# Patient Record
Sex: MALE | Race: WHITE
[De-identification: names, ages, dates, MRNs, and addresses within clinical notes are randomized per-mention and may not be internally consistent; named-entity substitution may affect disease eponyms.]

---

## 2018-03-19 ENCOUNTER — HOSPITAL ENCOUNTER (EMERGENCY)
Dept: HOSPITAL 18 - NAV ERS | Age: 63
Discharge: ANOTHER HOSPITAL | End: 2018-03-19
Payer: COMMERCIAL

## 2018-03-19 DIAGNOSIS — F17.210: ICD-10-CM

## 2018-03-19 DIAGNOSIS — I10: ICD-10-CM

## 2018-03-19 DIAGNOSIS — Z79.51: ICD-10-CM

## 2018-03-19 DIAGNOSIS — Z79.899: ICD-10-CM

## 2018-03-19 DIAGNOSIS — F10.27: ICD-10-CM

## 2018-03-19 DIAGNOSIS — E78.5: ICD-10-CM

## 2018-03-19 DIAGNOSIS — J44.9: ICD-10-CM

## 2018-03-19 DIAGNOSIS — I48.91: Primary | ICD-10-CM

## 2018-03-19 DIAGNOSIS — E11.9: ICD-10-CM

## 2018-03-19 LAB
ALBUMIN SERPL BCG-MCNC: 3.7 G/DL (ref 3.4–4.8)
ALP SERPL-CCNC: 85 U/L (ref 40–150)
ALT SERPL W P-5'-P-CCNC: 20 U/L (ref 8–55)
ANION GAP SERPL CALC-SCNC: 20 MMOL/L (ref 10–20)
APTT PPP: 28.1 SEC (ref 22.9–36.1)
AST SERPL-CCNC: 30 U/L (ref 5–34)
BASOPHILS # BLD AUTO: 0.2 THOU/UL (ref 0–0.2)
BASOPHILS NFR BLD AUTO: 1.9 % (ref 0–1)
BILIRUB SERPL-MCNC: 0.3 MG/DL (ref 0.2–1.2)
BUN SERPL-MCNC: 16 MG/DL (ref 8.4–25.7)
CALCIUM SERPL-MCNC: 9.5 MG/DL (ref 7.8–10.44)
CHLORIDE SERPL-SCNC: 101 MMOL/L (ref 98–107)
CK MB SERPL-MCNC: 2.7 NG/ML (ref 0–6.6)
CK SERPL-CCNC: 105 U/L (ref 30–200)
CO2 SERPL-SCNC: 19 MMOL/L (ref 23–31)
CREAT CL PREDICTED SERPL C-G-VRATE: 0 ML/MIN (ref 70–130)
EOSINOPHIL # BLD AUTO: 0.1 THOU/UL (ref 0–0.7)
EOSINOPHIL NFR BLD AUTO: 0.6 % (ref 0–10)
GLOBULIN SER CALC-MCNC: 2.9 G/DL (ref 2.4–3.5)
GLUCOSE SERPL-MCNC: 171 MG/DL (ref 80–115)
HGB BLD-MCNC: 11.3 G/DL (ref 14–18)
INR PPP: 1.2
LIPASE SERPL-CCNC: 19 U/L (ref 8–78)
LYMPHOCYTES # BLD AUTO: 1.7 THOU/UL (ref 1.2–3.4)
LYMPHOCYTES NFR BLD AUTO: 16.9 % (ref 21–51)
MCH RBC QN AUTO: 26.4 PG (ref 27–31)
MCV RBC AUTO: 81.6 FL (ref 80–94)
MONOCYTES # BLD AUTO: 1.8 THOU/UL (ref 0.11–0.59)
MONOCYTES NFR BLD AUTO: 17.3 % (ref 0–10)
NEUTROPHILS # BLD AUTO: 6.5 THOU/UL (ref 1.4–6.5)
NEUTROPHILS NFR BLD AUTO: 63.3 % (ref 42–75)
PLATELET # BLD AUTO: 158 THOU/UL (ref 130–400)
POTASSIUM SERPL-SCNC: 3.9 MMOL/L (ref 3.5–5.1)
PROTHROMBIN TIME: 15.3 SEC (ref 12–14.7)
RBC # BLD AUTO: 4.3 MILL/UL (ref 4.7–6.1)
SODIUM SERPL-SCNC: 136 MMOL/L (ref 136–145)
TROPONIN I SERPL DL<=0.01 NG/ML-MCNC: (no result) NG/ML (ref ?–0.03)
WBC # BLD AUTO: 10.2 THOU/UL (ref 4.8–10.8)

## 2018-03-19 PROCEDURE — 96372 THER/PROPH/DIAG INJ SC/IM: CPT

## 2018-03-19 PROCEDURE — 93005 ELECTROCARDIOGRAM TRACING: CPT

## 2018-03-19 PROCEDURE — 94760 N-INVAS EAR/PLS OXIMETRY 1: CPT

## 2018-03-19 PROCEDURE — 80053 COMPREHEN METABOLIC PANEL: CPT

## 2018-03-19 PROCEDURE — 84484 ASSAY OF TROPONIN QUANT: CPT

## 2018-03-19 PROCEDURE — 82553 CREATINE MB FRACTION: CPT

## 2018-03-19 PROCEDURE — 85610 PROTHROMBIN TIME: CPT

## 2018-03-19 PROCEDURE — 96376 TX/PRO/DX INJ SAME DRUG ADON: CPT

## 2018-03-19 PROCEDURE — 85025 COMPLETE CBC W/AUTO DIFF WBC: CPT

## 2018-03-19 PROCEDURE — 83690 ASSAY OF LIPASE: CPT

## 2018-03-19 PROCEDURE — 82550 ASSAY OF CK (CPK): CPT

## 2018-03-19 PROCEDURE — 83880 ASSAY OF NATRIURETIC PEPTIDE: CPT

## 2018-03-19 PROCEDURE — 80307 DRUG TEST PRSMV CHEM ANLYZR: CPT

## 2018-03-19 PROCEDURE — 71045 X-RAY EXAM CHEST 1 VIEW: CPT

## 2018-03-19 PROCEDURE — 96365 THER/PROPH/DIAG IV INF INIT: CPT

## 2018-03-19 PROCEDURE — 85730 THROMBOPLASTIN TIME PARTIAL: CPT

## 2018-03-19 PROCEDURE — 36416 COLLJ CAPILLARY BLOOD SPEC: CPT

## 2018-03-20 ENCOUNTER — HOSPITAL ENCOUNTER (INPATIENT)
Dept: HOSPITAL 92 - ERS | Age: 63
LOS: 4 days | Discharge: HOME | DRG: 274 | End: 2018-03-24
Attending: INTERNAL MEDICINE | Admitting: INTERNAL MEDICINE
Payer: COMMERCIAL

## 2018-03-20 VITALS — BODY MASS INDEX: 16 KG/M2

## 2018-03-20 DIAGNOSIS — E86.0: ICD-10-CM

## 2018-03-20 DIAGNOSIS — I48.2: Primary | ICD-10-CM

## 2018-03-20 DIAGNOSIS — E03.9: ICD-10-CM

## 2018-03-20 DIAGNOSIS — F10.239: ICD-10-CM

## 2018-03-20 DIAGNOSIS — K52.9: ICD-10-CM

## 2018-03-20 DIAGNOSIS — E11.9: ICD-10-CM

## 2018-03-20 DIAGNOSIS — F10.27: ICD-10-CM

## 2018-03-20 DIAGNOSIS — D63.8: ICD-10-CM

## 2018-03-20 DIAGNOSIS — I10: ICD-10-CM

## 2018-03-20 DIAGNOSIS — Z91.14: ICD-10-CM

## 2018-03-20 DIAGNOSIS — I25.10: ICD-10-CM

## 2018-03-20 DIAGNOSIS — F17.210: ICD-10-CM

## 2018-03-20 DIAGNOSIS — Z95.1: ICD-10-CM

## 2018-03-20 DIAGNOSIS — F10.221: ICD-10-CM

## 2018-03-20 DIAGNOSIS — E78.5: ICD-10-CM

## 2018-03-20 DIAGNOSIS — R55: ICD-10-CM

## 2018-03-20 DIAGNOSIS — E44.0: ICD-10-CM

## 2018-03-20 LAB
ALBUMIN SERPL BCG-MCNC: 3.3 G/DL (ref 3.4–4.8)
ALP SERPL-CCNC: 79 U/L (ref 40–150)
ALT SERPL W P-5'-P-CCNC: 14 U/L (ref 8–55)
AST SERPL-CCNC: 14 U/L (ref 5–34)
BILIRUB DIRECT SERPL-MCNC: 0.1 MG/DL (ref 0.1–0.3)
BILIRUB SERPL-MCNC: 0.3 MG/DL (ref 0.2–1.2)
CRYSTAL-AUWI FLAG: 0 (ref 0–15)
DRUG SCREEN CUTOFF: (no result)
HEV IGM SER QL: 1.8 (ref 0–7.99)
HYALINE CASTS #/AREA URNS LPF: (no result) LPF
MEDTOX CONTROL LINE VALID?: (no result)
MEDTOX READER #: (no result)
PATHC CAST-AUWI FLAG: 0 (ref 0–2.49)
RBC UR QL AUTO: (no result) HPF (ref 0–3)
SP GR UR STRIP: (no result) (ref 1–1.04)
SPERM-AUWI FLAG: 0 (ref 0–9.9)
TROPONIN I SERPL DL<=0.01 NG/ML-MCNC: (no result) NG/ML (ref ?–0.03)
TROPONIN I SERPL DL<=0.01 NG/ML-MCNC: (no result) NG/ML (ref ?–0.03)
WBC UR QL AUTO: (no result) HPF (ref 0–3)
YEAST-AUWI FLAG: 0 (ref 0–25)

## 2018-03-20 PROCEDURE — A4216 STERILE WATER/SALINE, 10 ML: HCPCS

## 2018-03-20 PROCEDURE — 93312 ECHO TRANSESOPHAGEAL: CPT

## 2018-03-20 PROCEDURE — 85025 COMPLETE CBC W/AUTO DIFF WBC: CPT

## 2018-03-20 PROCEDURE — 96375 TX/PRO/DX INJ NEW DRUG ADDON: CPT

## 2018-03-20 PROCEDURE — 93623 PRGRMD STIMJ&PACG IV RX NFS: CPT

## 2018-03-20 PROCEDURE — 85018 HEMOGLOBIN: CPT

## 2018-03-20 PROCEDURE — 85610 PROTHROMBIN TIME: CPT

## 2018-03-20 PROCEDURE — 76942 ECHO GUIDE FOR BIOPSY: CPT

## 2018-03-20 PROCEDURE — 96366 THER/PROPH/DIAG IV INF ADDON: CPT

## 2018-03-20 PROCEDURE — 93653 COMPRE EP EVAL TX SVT: CPT

## 2018-03-20 PROCEDURE — 74177 CT ABD & PELVIS W/CONTRAST: CPT

## 2018-03-20 PROCEDURE — C1769 GUIDE WIRE: HCPCS

## 2018-03-20 PROCEDURE — 36415 COLL VENOUS BLD VENIPUNCTURE: CPT

## 2018-03-20 PROCEDURE — 96367 TX/PROPH/DG ADDL SEQ IV INF: CPT

## 2018-03-20 PROCEDURE — C1730 CATH, EP, 19 OR FEW ELECT: HCPCS

## 2018-03-20 PROCEDURE — 93306 TTE W/DOPPLER COMPLETE: CPT

## 2018-03-20 PROCEDURE — 96365 THER/PROPH/DIAG IV INF INIT: CPT

## 2018-03-20 PROCEDURE — 84443 ASSAY THYROID STIM HORMONE: CPT

## 2018-03-20 PROCEDURE — 84484 ASSAY OF TROPONIN QUANT: CPT

## 2018-03-20 PROCEDURE — 80076 HEPATIC FUNCTION PANEL: CPT

## 2018-03-20 PROCEDURE — 80306 DRUG TEST PRSMV INSTRMNT: CPT

## 2018-03-20 PROCEDURE — 83036 HEMOGLOBIN GLYCOSYLATED A1C: CPT

## 2018-03-20 PROCEDURE — 85014 HEMATOCRIT: CPT

## 2018-03-20 PROCEDURE — 80048 BASIC METABOLIC PNL TOTAL CA: CPT

## 2018-03-20 PROCEDURE — 93613 INTRACARDIAC EPHYS 3D MAPG: CPT

## 2018-03-20 PROCEDURE — 81001 URINALYSIS AUTO W/SCOPE: CPT

## 2018-03-20 PROCEDURE — 93005 ELECTROCARDIOGRAM TRACING: CPT

## 2018-03-20 PROCEDURE — 70450 CT HEAD/BRAIN W/O DYE: CPT

## 2018-03-20 RX ADMIN — AMIODARONE HYDROCHLORIDE SCH MLS: 50 INJECTION, SOLUTION INTRAVENOUS at 18:06

## 2018-03-20 RX ADMIN — Medication SCH ML: at 21:00

## 2018-03-20 RX ADMIN — BUPROPION HYDROCHLORIDE SCH MG: 100 TABLET, EXTENDED RELEASE ORAL at 23:22

## 2018-03-20 NOTE — CON-2
Dictated as scribe for Dr. Montana Ortega



DATE OF CONSULTATION:  03/20/2018

 

REFERRING PHYSICIAN:  Jeff Nuñez M.D.

 

REASON FOR CONSULTATION:  Atrial arrhythmias.

 

HISTORY OF PRESENT ILLNESS:  Mr. Medellin is a 63-year-old gentleman who 
presented to the emergency room today 03/20/2018 with complaints of palpitations
, weakness, passing out and also some abdominal pain.  His daughter is with him 
as patient is a very poor historian and struggles significantly with alcohol 
abuse and some dementia.  He reports that he has a history of atrial 
fibrillation and that he has been on chronic anticoagulation with Coumadin for 
years now.  He has not been consistent with his therapy on this and when he 
runs out, will just go on aspirin alone.  He denies any prior history of 
strokes or any recent stroke or stroke-like symptoms including unilateral 
weakness, speech changes, vision changes, paresthesias or facial droop.  He 
does report that he occasionally will have his heart race, that he has 
dizziness and lightheadedness and that this usually occurs with exertion which 
he attributes to working too hard outside.  He also reports that he has had 
some mild abdominal pain, but denies nausea, vomiting, diarrhea, constipation 
or blood in the stool.  He has shortness of breath with exertion and endorses 
smoking 1-2 packs of cigarettes a day for past 50 years.  He continues to binge 
drink on a frequent basis, but reports his last drink was approximately a week 
ago.  In the past has seen a Dr. Kamla Gurrola for primary care and Dr. Dann Shi for Cardiology.

 

PAST MEDICAL HISTORY:

1.  Atrial fibrillation, status post maze procedure at time of coronary artery 
bypass grafting.

2.  Coronary artery disease, status post 2-vessel bypass in 2013

3.  Hyperlipidemia.

4.  Hypertension.

5.  Chronic obstructive pulmonary disease.

6.  Hypothyroidism.

7.  Type 2 diabetes.

8.  Dementia, secondary to alcohol abuse.

 

PAST SURGICAL HISTORY:  

1.  Coronary artery bypass, 2-vessel in 2013 with maze procedure in 2013 (
unsuccessful).  

2.  Electrical cardioversion x4 for atrial fibrillation.

 

FAMILY HISTORY:  Positive for coronary artery disease (brother).  Negative for 
aortic aneurysm.

 

SOCIAL HISTORY:  Current tobacco habituation greater than a 50-pack-year 
history.  Binge drinks at least on a weekly basis.  Reports no illicit drug use.

 

REVIEW OF SYSTEMS:

CONSTITUTIONAL:  Negative for fevers, chills or fatigue.

HEENT:  Negative for blurred vision, ringing in the ears or nose bleeds.

CARDIOVASCULAR:  Negative for chest pain.  Positive for heart racing and 
palpitations.

RESPIRATORY:  Positive for dyspnea on exertion.  Negative for blood in the 
sputum, productive cough or progressive shortness of breath.

GI:  Negative for nausea, vomiting, diarrhea or blood in the stool.  Positive 
for mild abdominal pain.

GENITOURINARY:  Negative for frequency, hesitancy or blood in the urine.

NEUROLOGIC:  Positive for multiple episodes of passing out in the recent past, 
most follow standing up after using the toilet.

HEMATOLOGIC:  Negative for excessive uncontrolled bleeding or bleeding 
dyscrasias.

 

MEDICATIONS AT HOME:  PENICILLIN, PROAMATINE, CODEINE, CLONAZEPAM.  

 

CURRENT MEDICATIONS:  (According to office visit by Dr. Dann Shi 01/04/
2017), buspirone HCL 30 mg 1 tab b.i.d., levothyroxine sodium 75 mcg 1 tab p.o. 
daily, lovastatin 10 mg p.o. daily, sublingual nitro as needed, propranolol 40 
mg p.o. b.i.d., albuterol as needed, aspirin 325 mg daily, Coumadin 5 mg as 
directed, Norvasc 5 mg 1 tablet daily, ropinirole 0.5 mg p.o. at bedtime, 
Spiriva as directed, Symbicort 160/4.5 oral inhaler, trazodone 100 mg 2 tabs 
p.o. at bedtime and Ventolin HFA as directed.

 

PHYSICAL EXAMINATION:

VITAL SIGNS:  Temperature 97.2, heart rate is 101, blood pressure 118/61, 
respirations are 20, oxygen is 98% on room air.  The patient is 6 feet, 118 
pounds, BMI of 16.1.

GENERAL:  The patient is a malnourished  male in no acute distress.  
He is resting in bed, but is very fidgety. He appears older than his stated age
, frail and weak.

HEENT:  He is normocephalic, atraumatic.  His pupils are equal, round, reactive 
to light and accommodating.  Sclerae are anicteric.  His speech is clear and he 
is somewhat restless in his behavior.

NECK:  Supple without jugular venous distention.  His thyroid is nonpalpable 
and carotids are without bruit.

CARDIOVASCULAR:  His heart rate is irregularly irregular.  His extremities are 
warm and dry to touch without clubbing, cyanosis or edema.  PMI is nondisplaced.

PULMONARY:  Respirations are even and unlabored, but wheezing is noted 
throughout all lung fields bilaterally.  

ABDOMEN:  Soft and nontender, no palpable masses.  Hepatojugular reflex is 
negative.

NEUROLOGIC:  Cranial nerves II-XII is grossly intact and exam is nonfocal.  The 
patient answers orientation questions appropriately, but attention span is 
quite short.

 

DATABASE:  Chemistry from today; hemoglobin A1c is 5.0, total bilirubin is 0.3, 
AST is 14, ALT 14, alkaline phosphatase is 79.  Two troponin I's have been 
collected and are less than 0.01.  Albumin is 3.3.  TSH is 6.15.  No other lab 
results are available.  

 

Telemetry and EKGs were personally reviewed and indicate typical CTI dependent 
flutter with variable AV conduction, mostly 2:1.  No atrial fibrillation is 
seen.  Previous EKGs document RVR as high as 137 beats per minute.  The patient 
is currently maintaining in the  beat per minute range.

 

IMPRESSION:

1.  Typical atrial flutter with rapid ventricular response.

2.  History of atrial fibrillation, status post maze procedure without evidence 
of recurrence this admission; however, according to record review with multiple 
failed cardioversions for atrial fibrillation in the past.

3.  Coronary artery disease with prior 2-vessel bypass in 2013.

4.  Alcohol abuse.

5.  Loss of consciousness with possible orthostatic blood pressure with 
elevated heart rates given his arrhythmia.  Possibly cardiogenic in origin, but 
likely a vasovagal response.

 

PLAN:  

1.  Our recommendation is to proceed with CTI ablation for typical atrial 
flutter tomorrow after a MIGUEL done by Cardiology to evaluate for any possible 
intracoronary thrombus given the patient's intermittent and less than compliant 
therapy with the warfarin.

2.  We will follow up as an outpatient for additional evaluation for a Watchman 
given the patient's recent falls and risk for bleeding.  We will monitor for 
recurrence of arrhythmias and discuss Watchman further with him in clinic.  

3.  Regarding the patient's alcohol abuse and his possible withdrawal symptoms 
beginning we will schedule the patient for his ablation tomorrow, but if he 
begins to have significant withdrawal symptoms the procedure may need to be 
postponed for patient safety.

 

Thank you for allowing us to participate in the care of this patient.

 

I performed the interview and physicial exam with decision making personally.  
PARAM

## 2018-03-20 NOTE — CT
PRELIMINARY REPORT/[facility]/EMERGENCY AFTER

HOURS PROCEDURE:

 

EXAM:

CT Abdomen and Pelvis With Intravenous Contrast

 

EXAM DATE/TIME:

Exam ordered 3/20/2018 2:35 AM

 

CLINICAL HISTORY:

63 years old, male; Pain; Abdominal pain; Generalized; Patient HX: 63m transfer from [facility] for generali
zed weakness. Found to be in a fib with rvr at a rate of 140's given lovenox and started on dilt drip
. Patient now complaining of diffuse abdominal pain. Has not been taking coumadin in 1 week.

Denies chest pain and shortness of breath

 

TECHNIQUE:

Axial computed tomography images of the abdomen and pelvis with intravenous contrast. All CT scans at
 this facility use one or more dose reduction techniques, viz.: automated exposure control;

ma/kV adjustment per patient size (including targeted exams where dose is matched to indication; i.e.
 head); or iterative reconstruction technique.

Coronal reformatted images were created and reviewed.

 

CONTRAST:

100 mL of  administered intravenously.

 

COMPARISON:

No relevant prior studies available.

 

FINDINGS:

Lower thorax: No acute findings.

 

ABDOMEN:

Liver: No pneumatosis or portal/mesenteric venous gas.

Gallbladder and bile ducts: Unremarkable. No calcified stones. No ductal dilation.

Pancreas: Unremarkable. No mass. No ductal dilation.

Spleen: Unremarkable. No splenomegaly.

Adrenals: Unremarkable. No mass.

Kidneys and ureters: Unremarkable. No solid mass. No hydronephrosis.

Stomach and bowel: Inflammatory thickening of the wall of short segments of distal ileum, including t
he terminal ileum, as well as the colon, consistent with enteritis and colitis. No intestinal obstruc
tion.

Appendix: Appendix not visualized. No evidence of appendicitis.

 

PELVIS:

Bladder: Unremarkable. No mass.

Reproductive: Unremarkable as visualized.

 

ABDOMEN and PELVIS:

Intraperitoneal space: Trace volume ascites. No pneumoperitoneum or abscess.

Bones/joints: No acute fracture. No dislocation.

Soft tissues: Bilateral fat containing inguinal hernias.

Vasculature: Multifocal AAA, measuring up to 2.5 cm. No rupture. SMV and SMA are patent as far as can
 be traced.

Lymph nodes: Unremarkable. No enlarged lymph nodes.

 

IMPRESSION:

1. Enteritis, including the terminal ileum, and colitis. Findings are most likely a result of infecti
on or inflammatory bowel disease. Ischemia less likely but not excluded. No specific ancillary findin
gs of bowel ischemia.

2. Trace volume ascites.

 

Thank you for allowing us to participate in the care of your patient.

 

Dictated and Authenticated by: Denzel Anderson MD

03/20/2018 3:23 AM Central Time ([place] & [place])

 

 

 

FINAL REPORT 

CT ABDOMEN AND PELVIS WITH IV CONTRAST:

 

FINDINGS/IMPRESSION: 

I agree with the preliminary report given by Dr. Denzel Anderson of [facility]. Additionally, there is colo
jesus diverticulosis without diverticulitis. 

 

POS: OFF

## 2018-03-20 NOTE — CON
DATE OF CONSULTATION:  03/20/2018

 

HISTORY OF PRESENT ILLNESS:  The patient is a 63-year-old gentleman with a 
history of chronic atrial fibrillation, who presented after losing 
consciousness.  The patient has a history of atrial fibrillation.  He is on 
chronic anticoagulation therapy.  The patient also has a history of coronary 
artery disease and is status post coronary bypass surgery x3.  The patient has 
a history also of dementia and alcohol abuse.  The patient was in his usual 
state of health when apparently he suddenly lost consciousness.  The patient 
states that this occurred on several occasions.  The patient denied having any 
palpitations.

 

PAST MEDICAL HISTORY:  

1.  Coronary artery disease.

2.  COPD.

3.  Coronary bypass surgery.

4.  Hypertension.

 

PAST SURGICAL HISTORY:  Appendectomy, knee surgery, wrist surgery and foot 
surgery.

 

SOCIAL HISTORY:  The patient has a long history of tobacco abuse.  He consumes 
excessive amounts of alcohol.

 

MEDICATIONS:  See nursing list.

 

FAMILY HISTORY:  Positive family history of heart disease.

 

ALLERGIES:   CODEINE, PENICILLIN, CLONAZEPAM and PROTAMINE.

 

REVIEW OF SYSTEMS:  The patient denies any bruising, bright red blood and 
hematuria.

 

PHYSICAL EXAMINATION:

GENERAL:  This is a thin gentleman, in no acute distress.

VITAL SIGNS:  Blood pressure is 120 and irregular heart.

NECK:  Showed no jugular venous distention.

LUNGS:  Coarse breath sounds bilateral.

HEART:  Irregular rate and rhythm, normal S1, S2.

ABDOMEN:  Nondistended.

EXTREMITIES:  Showed trace edema.

SKIN:  Warm and dry.

NEUROLOGIC:  Nonfocal.

VASCULAR:  Radial pulses 2+.

 

LABORATORY:  White blood count is 10.2, hemoglobin 11.3, hematocrit 35.1, 
platelets 158.  Sodium is 139, potassium 3.9, chloride 101, bicarbonate 19, BUN 
16, creatinine 1.0, glucose 171.  Troponin less than 0.01.  BNP is 140.  His 
EKG revealed atrial fibrillation with a rapid ventricular response and ST-T 
wave abnormality suggestive of ischemia.

 

IMPRESSION:

1.  Syncope.

2.  Atrial fibrillation with a rapid ventricular response.

3.  History of coronary bypass surgery.

4.  History of alcohol-induced dementia.

5.  Hypertension.

6.  Tobacco abuse.

7.  Ethanol abuse.

 

This gentleman presents with rapid atrial fibrillation and syncope.  From a 
cardiac standpoint, we will ask EP to evaluate since  he may be at high risk 
being on long-term anticoagulation with his history of dementia, falling, and 
ethanol abuse.  We will control the patient's heart with IV amiodarone and use 
IV Lopressor.  We will follow this patient with you through his hospitalization.

 

PARAM

## 2018-03-20 NOTE — CON
DATE OF CONSULTATION:  03/20/2018

 

SERVICE:  Pulmonary Medicine.

 

REASON FOR CONSULTATION:  Alcohol withdrawal.

 

HISTORY OF PRESENT ILLNESS:  The patient is a 63-year-old white male with past 
medical history significant for alcohol abuse.  Every time he gets into the 
hospital setting and is away from his alcohol, he has severe withdrawal 
features.  The same has occurred today.  He came in with a negative alcohol 
level.  That being said, he continues to heavily drink.  He lives with his 
brother.  It is a fairly bad social situation, and APS is possibly going to be 
getting involved.  Either way, the daughter is here and is providing a little 
bit of history.  She is not present on a daily basis in her dad's life.  That 
being said, he has had multiple previous presentations with psychosis 
associated with interruption of alcohol.  She is not aware of him ever having 
had a seizure.  He certainly cannot provide any additional elements of the 
history at this time.

 

PAST MEDICAL HISTORY:

1.  Alcohol abuse with history of withdrawal.

2.  Tobacco abuse.

3.  Hypertension.

4.  Type 2 diabetes mellitus.

 

PAST SURGICAL HISTORY:  Coronary bypass graft.

 

FAMILY HISTORY:  Noncontributory.

 

SOCIAL HISTORY:  He smokes 2 packs of cigarettes on a daily basis and has done 
so for greater than 40 years.  He drinks extraordinarily heavily and quit 
roughly one week prior to admission, if not a little less.  There is no known 
illicit drugs.

 

ALLERGIES:  CODEINE, PENICILLIN. PROTAMINE, CLONAZEPAM.

 

REVIEW OF SYSTEMS:  This cannot be obtained as the patient is currently 
encephalopathic.

 

PHYSICAL; EXAMINATION:

VITAL SIGNS:  Afebrile.  Pulse 146, blood pressure 136/88, respirations 25, 
saturation 100% on room air.

GENERAL:  The patient is awake and alert.  He is in no apparent distress.

LUNGS:  Excellent air entry.  There is a prolonged expiratory phase, but I do 
not hear adventitious sounds, wheezing, rhonchi, or crackles.

HEART:  Tachycardic.  Irregular.

ABDOMEN:  Soft, nontender, nondistended.  Bowel sounds are positive.

MUSCULOSKELETAL:  No cyanosis or clubbing.  No pitting in the bilateral lower 
extremities.

NEUROLOGIC:  Grossly nonfocal.

 

LABORATORY DATA:  Hemoglobin A1c 5.0.  Liver function studies are essentially 
unremarkable.  Urinalysis is negative.  Urine drug screen is positive for 
opiates, barbiturates, benzodiazepines, and cannabinoids.  CBC is mostly 
unremarkable except for hemoglobin 11.3.  Platelets are 158.  INR 1.2.  
Troponins are negative x3.  Basic metabolic profile is completely unremarkable 
with a normal creatinine.  Plasma alcohol less than 10.

 

IMAGING:  CT of the abdomen and pelvis demonstrates findings consistent with 
enteritis including the terminal ileum in colon.  Ischemic bowel is a 
possibility.  Minimal ascites is present.

 

ASSESSMENT:

1.  Delirium tremens.

2.  Alcohol abuse, heavy.

3.  Atrial fibrillation with rapid ventricular response.

4.  Dehydration, severe.

5.  Colitis.

 

PLAN:  The patient is dehydrated now, so we are going given 2 liters of normal 
saline.  After this, I will provide him with half NS, add 100 an hour to 
rehydrate his intracellular space.  We will put him on enteric antimicrobial 
therapy.  This may be ischemic.  All centrally acting medications including 
narcotic medications are going to be interrupted.  We will put him on Precedex; 
however, as this hopefully will help with some of his withdrawal features.  The 
normal saline will be suspended in favor of half normal saline,  Pulmonary 
Critical Care will continue to follow while the patient remains in this 
location.

 

70 minutes have been devoted to this patient in various activities.  I 
personally reviewed all imaging studies and laboratory data noted within this 
document.  For fifty percent of this time, I was interacting with the patient 
at the bedside or coordinating care with the care team.  For the remainder of 
the time I was immediately available to the patient in the hospital unit.  



PARAM

## 2018-03-20 NOTE — HP
CHIEF COMPLAINT:  Palpitation and weakness.

 

HISTORY OF PRESENT ILLNESS:  This is a 63-year-old male admitted to the hospital because of palpitati
ons, weakness, as well as having some abdominal pain.  Patient's history obtained from his daughter a
s he is a very poor history provider.  A 63-year-old male, who apparently has a significant history f
or coronary artery bypass grafting x3, hypertension, history of diabetes, a 60 pound weight loss in t
he last 3-4 months per daughter, and the patient presents with a mild complaints of abdominal pain an
d palpitations.  Patient states that he has had a history of atrial fibrillation in the past and has 
not kept up with his medications, went to another hospital and was transferred here for further manag
ement and care.  The patient states that he does not have any nausea, vomiting, diarrhea, constipatio
n, chest pains, fevers, or chills.  Does admit to some shortness of breath on exertion.  Patient also
 admits to smoking 1-2 packs of cigarettes a day for the past 50 years.  The patient denies any prior
 history of strokes, but per daughter, he has had a history of cardiac bypass x3, was apparently a di
abetic in the past; however, was recently told that he does not require anymore medications or pills 
given the significant amount of weight loss that he has had, as well as properly controlled blood sug
ars.  The patient otherwise has no other complaints.  No alleviating or aggravating factors.  The pat
ient seen and examined in the emergency room.  Daughter at bedside.

 

ALLERGIES:  CODEINE, PENICILLIN, PROTAMINE, patient also admits allergies to CLONAZEPAM; however, oralia
es VALIUM at home.

 

PAST MEDICAL HISTORY:  Chronic smoking history, binge drinking, cardiac bypass x3, hypertension, weig
ht loss, and diabetes mellitus type 2.

 

FAMILY HISTORY:  Positive for diabetes, high blood pressure, and stroke.

 

SOCIAL HISTORY:  The patient admits to smoking a pack or 2 cigarettes a day for the past 50 years.  T
he patient also admits to binge drinking, last drink was approximately a week ago.

 

HOME MEDICATIONS:  See MAR.

 

REVIEW OF SYSTEMS:  Twelve-point review of systems performed, pertinent positives in the HPI, otherwi
se negative.

 

PHYSICAL EXAMINATION:

VITAL SIGNS:  Blood pressure was 118/90, heart rate atrial fibrillation rhythm of 109, respiratory ra
te 12, and saturations 100% on 2 liters nasal cannula.

GENERAL:  Patient appearing very fidgety; however, in no acute distress, sitting in bed, appears very
 frail and weak.

HEENT:  Head is normocephalic, atraumatic.  Pupils equal, round, reactive to light and accommodation.


NECK:  Supple.  Palpable nontender, mobile thyroid.  Oral cavity moist and pink.

CARDIOVASCULAR:  Irregular rate and rhythm.  S1 and S2 appreciated.  Faint systolic ejection murmur 1
/6 also appreciated.

LUNGS:  Wheezing noted in all lung fields.  Aerating well.  No changes in AP diameter.

ABDOMEN:  Soft, nontender, positive bowel sounds.

EXTREMITIES:  2+ peripheral pulses.  No loss of sensory function or motor function.

NEUROLOGIC:  Cranial nerves II-XII intact.  Alert, oriented x3, slow speech; however, does answer que
stions appropriately.

 

LABORATORY DATA:  Pending.  First set of troponins is less than 0.01.  The patient had a CT scan of t
he abdomen and pelvis done with contrast, which stated that the patient may have a neuritis of the te
rminal ileum and colitis as well likely related to infectious process, colonic diverticulosis without
 diverticulitis also noted.

 

ASSESSMENT AND PLAN:

1.  Atrial fibrillation.

2.  Hypertension.

3.  Coronary artery disease.

4.  Hyperlipidemia.

5.  Diabetes mellitus type 2.

6.  Weight loss.

7.  Smoking abuse, nicotine abuse.

8.  Alcohol abuse and binge drinking.

9.  Enteritis.

10.  At this point in time, we will admit the patient to the hospital.  Consult, on call Cardiology. 
 Cardizem drip unavailable.  We will start the patient on amiodarone drip.  The patient does have a C
HADS2-VASc score of 3.  We will start the patient on Lovenox 100 q.12 hours.  The patient's baseline 
weight is around 50 kilograms.

11.  Target blood pressure of 120-140 systolic.

12.  We will get an A1c and start the patient on a sliding scale if the A1c indicates the patient is 
diabetic.

13.  IV fluids will be held at this point in time, patient can have a cardiac diet.

14.  PT and OT evaluation.

15.  We will provide the patient with Ativan for anxiety q.12 hours.

16.  We will obtain TSH, urinalysis, urine drug screen, CBC, CMP, and liver function test.

17.  We will follow up with lab work in a.m. and make any further adjustments as appropriate.

18.  Case and plan discussed with the patient and the patient's daughter.  They wish to have a code s
tatus of FULL CODE for now; however, state that depending on results, they are open to changing code 
status to DNR/DNI after discussion with family.  Case and plan discussed with the patient's daughter 
and the patient at length.  They understand and agree with this plan.

## 2018-03-21 LAB
ANION GAP SERPL CALC-SCNC: 11 MMOL/L (ref 10–20)
BASOPHILS # BLD AUTO: 0 THOU/UL (ref 0–0.2)
BASOPHILS NFR BLD AUTO: 0.2 % (ref 0–1)
BUN SERPL-MCNC: 10 MG/DL (ref 8.4–25.7)
CALCIUM SERPL-MCNC: 8.1 MG/DL (ref 7.8–10.44)
CHLORIDE SERPL-SCNC: 108 MMOL/L (ref 98–107)
CO2 SERPL-SCNC: 22 MMOL/L (ref 23–31)
CREAT CL PREDICTED SERPL C-G-VRATE: 75 ML/MIN (ref 70–130)
EOSINOPHIL # BLD AUTO: 0 THOU/UL (ref 0–0.7)
EOSINOPHIL NFR BLD AUTO: 0.5 % (ref 0–10)
GLUCOSE SERPL-MCNC: 127 MG/DL (ref 80–115)
HGB BLD-MCNC: 8.9 G/DL (ref 14–18)
LYMPHOCYTES # BLD: 0.7 THOU/UL (ref 1.2–3.4)
LYMPHOCYTES NFR BLD AUTO: 8.6 % (ref 21–51)
MCH RBC QN AUTO: 27.9 PG (ref 27–31)
MCV RBC AUTO: 86.5 FL (ref 80–94)
MONOCYTES # BLD AUTO: 1.1 THOU/UL (ref 0.11–0.59)
MONOCYTES NFR BLD AUTO: 13.5 % (ref 0–10)
NEUTROPHILS # BLD AUTO: 6.4 THOU/UL (ref 1.4–6.5)
NEUTROPHILS NFR BLD AUTO: 77.2 % (ref 42–75)
PLATELET # BLD AUTO: 137 THOU/UL (ref 130–400)
POTASSIUM SERPL-SCNC: 3.5 MMOL/L (ref 3.5–5.1)
RBC # BLD AUTO: 3.17 MILL/UL (ref 4.7–6.1)
SODIUM SERPL-SCNC: 137 MMOL/L (ref 136–145)
WBC # BLD AUTO: 8.3 THOU/UL (ref 4.8–10.8)

## 2018-03-21 PROCEDURE — B24BZZ4 ULTRASONOGRAPHY OF HEART WITH AORTA, TRANSESOPHAGEAL: ICD-10-PCS | Performed by: INTERNAL MEDICINE

## 2018-03-21 PROCEDURE — 02583ZZ DESTRUCTION OF CONDUCTION MECHANISM, PERCUTANEOUS APPROACH: ICD-10-PCS | Performed by: INTERNAL MEDICINE

## 2018-03-21 RX ADMIN — THERA TABS SCH TAB: TAB at 08:45

## 2018-03-21 RX ADMIN — AMIODARONE HYDROCHLORIDE SCH MLS: 50 INJECTION, SOLUTION INTRAVENOUS at 10:22

## 2018-03-21 RX ADMIN — Medication SCH ML: at 08:59

## 2018-03-21 RX ADMIN — Medication SCH ML: at 21:33

## 2018-03-21 RX ADMIN — BUPROPION HYDROCHLORIDE SCH MG: 100 TABLET, EXTENDED RELEASE ORAL at 21:32

## 2018-03-21 RX ADMIN — BUPROPION HYDROCHLORIDE SCH MG: 100 TABLET, EXTENDED RELEASE ORAL at 08:57

## 2018-03-21 NOTE — ECHO
DATE OF SERVICE:

3/21/18

 

The patient is a 63-year-old man with history of ETOH abuse who had prior history of coronary bypass 
grafting surgery.  He is here for MIGUEL guided ablation procedure to rule out intracardiac clots.

 

PROCEDURE:  

The patient received a propofol per Anesthesia specialist.  After adequate the level of sedation achi
eved, a standard transesophageal echocardiogram probe was passed into esophagus without difficulty.  
Patient tolerated procedure well, no complications noted.

 

RESULTS:  

The Left atrium is upper limit normal in size, left appendage is well visualized, appears to be occlu
ded by the surgical ligation.  No apparent leak is detected. Four --------- were seen.  Interatrial s
eptum is free of defect.  Mitral valve is with trace regurgitation only.  The tricuspid valve also wi
th mild regurgitation.  Aortic valve has three leaflets without regurgitation or stenosis.  Pulmonic 
valve not regurgitant. Pericardial space without effusion.  Left ventricular function is preserved.  
The visualized portion of ascending and  descending aorta without adherent atheroma. No aneurysm or d
issection is identified.

 

CONCLUSION:

1.  No intracardiac clots detected.

2.  Occluded left appendage possibly due to prior surgical ligation without apparent leak.

3.  Normal left ventricular function.

4.  No significant valvular heart disease.

 

PLAN:

1.  Proceed with ablation.

## 2018-03-21 NOTE — PDOC.PN
- Subjective


Encounter Start Date: 03/21/18


Encounter Start Time: 10:29





CC: Afib





Sub: Pt denies dyspnea





- Objective


Resuscitation Status: 


 











Resuscitation Status           FULL:Full Resuscitation














Vital Signs & Weight: 


 Vital Signs (12 hours)











  Temp Pulse Resp BP Pulse Ox


 


 03/21/18 08:44   127 H   95/54 L 


 


 03/21/18 07:40  98.5 F  105 H  24 H   94 L


 


 03/21/18 07:00  98.5 F    








 Weight











Weight                         118 lb 6.212 oz











 Most Recent Monitor Data











Heart Rate from ECG            139


 


NIBP                           94/62


 


NIBP BP-Mean                   72


 


Respiration from ECG           25


 


SpO2                           96














I&O: 


 











 03/20/18 03/21/18 03/22/18





 06:59 06:59 06:59


 


Intake Total  773 0


 


Output Total  755 245


 


Balance  18 -245











Result Diagrams: 


 03/21/18 04:48





 03/21/18 04:48





Phys Exam





- Physical Examination


Constitutional: NAD


HEENT: moist MMs


Neck: no JVD


Respiratory: no wheezing, no rales, no rhonchi


Cardiovascular: no significant murmur, irregular


s1 s2 tachy, no gallop


Gastrointestinal: soft


Musculoskeletal: no edema


Neurological: non-focal


Psychiatric: normal affect, A&O x 3


Skin: no rash





Dx/Plan





- Plan








PT IS 63 YRS OLD MALE





1. Afib RVR


2. Alcohol abuse


3. HTN


4. HPL


5. DM type 2


6. Acute enteritis





Plan:





1. Currently on amiodarone drip. Scheduled for cardioversion this afternoon. 

Cardio on board.


2. Continue prn ativan and folic acid. AGitation stable at present


3. Continue statin


4. monitor blood sugars closely. Will add insulin sliding scale.


5. Continue cipro and flagyl


6. Currently on lovenox bid for anticoagulation





case d/w pt & RN

## 2018-03-21 NOTE — PRG
DATE OF SERVICE:  03/21/2018

 

SERVICE:  Pulmonary Medicine.

 

INTERVAL HISTORY:  The patient actually did fairly well on Precedex.  Despite the Precedex and his im
proved comfort, he had significant agitation still.  He pulled out multiple IVs which had to be repla
flynn overnight.  Thankfully, he did not have any additional falls.  He cannot provide any additional e
lements of the history, but he is much more awake and alert today.  Otherwise, there has been no inte
rval change to his condition.

 

PHYSICAL EXAMINATION:

VITAL SIGNS:  Afebrile, pulse 83, blood pressure 90/49, respirations 19, saturation 95% on room air.

GENERAL:  The patient is awake and alert.  He has minimal agitation.  He is certainly directable toda
y.

HEENT:  Normocephalic, atraumatic.  Sclerae are white, conjunctivae pink.  Oral mucosa is moist witho
ut lesions.

LUNGS:  Excellent air entry.  There is no prolonged expiratory phase or wheezing present.

HEART:  Normal rate, regular.

ABDOMEN:  Soft, nontender, nondistended.  Bowel sounds are positive.

MUSCULOSKELETAL:  No cyanosis or clubbing.  There is no pitting in the bilateral lower extremities.

NEUROLOGIC:  Grossly nonfocal.

 

LABORATORY DATA:  WBC 8.3, hemoglobin 8.9, platelets 137,000.  Neutrophil count is 77% predicted.  Ba
sic metabolic profile is unremarkable.  Hemoglobin A1c falls within the normal limits.  TSH 6.1.  Uri
ne drug screen is positive for multiple substances.

 

ASSESSMENT:

1.  Delirium tremens.

2.  Alcohol abuse, heavy.

3.  Polysubstance drug abuse.

4.  Atrial fibrillation with rapid ventricular response.

5.  Dehydration, improving.

6.  Colitis.

 

PLAN:  The patient is really doing quite well from a respiratory standpoint.  Hemodynamically, he rem
ains stable.  It does not appear that he has any significant end-organ disease at this point.  He is 
going down for an ablation at some point, but it is perfectly reasonable to postpone this for a coupl
e of days given his relative stability for his other neurologic issues to stabilize.  We will replace
 his potassium.  Pulmonary Critical Care will continue to follow as he will remain in this location f
or at least the next 24 hours.  We will wean the Precedex as tolerated.

## 2018-03-22 LAB
ANION GAP SERPL CALC-SCNC: 9 MMOL/L (ref 10–20)
BASOPHILS # BLD AUTO: 0 THOU/UL (ref 0–0.2)
BASOPHILS NFR BLD AUTO: 0.3 % (ref 0–1)
BUN SERPL-MCNC: 7 MG/DL (ref 8.4–25.7)
CALCIUM SERPL-MCNC: 7.8 MG/DL (ref 7.8–10.44)
CHLORIDE SERPL-SCNC: 108 MMOL/L (ref 98–107)
CO2 SERPL-SCNC: 23 MMOL/L (ref 23–31)
CREAT CL PREDICTED SERPL C-G-VRATE: 108 ML/MIN (ref 70–130)
EOSINOPHIL # BLD AUTO: 0.1 THOU/UL (ref 0–0.7)
EOSINOPHIL NFR BLD AUTO: 0.9 % (ref 0–10)
GLUCOSE SERPL-MCNC: 90 MG/DL (ref 80–115)
HGB BLD-MCNC: 7.5 G/DL (ref 14–18)
HGB BLD-MCNC: 8.2 G/DL (ref 14–18)
LYMPHOCYTES # BLD: 0.7 THOU/UL (ref 1.2–3.4)
LYMPHOCYTES NFR BLD AUTO: 12.3 % (ref 21–51)
MCH RBC QN AUTO: 27.5 PG (ref 27–31)
MCV RBC AUTO: 85.2 FL (ref 80–94)
MONOCYTES # BLD AUTO: 0.8 THOU/UL (ref 0.11–0.59)
MONOCYTES NFR BLD AUTO: 14.2 % (ref 0–10)
NEUTROPHILS # BLD AUTO: 4.2 THOU/UL (ref 1.4–6.5)
NEUTROPHILS NFR BLD AUTO: 72.2 % (ref 42–75)
PLATELET # BLD AUTO: 122 THOU/UL (ref 130–400)
POTASSIUM SERPL-SCNC: 3.8 MMOL/L (ref 3.5–5.1)
RBC # BLD AUTO: 2.72 MILL/UL (ref 4.7–6.1)
SODIUM SERPL-SCNC: 136 MMOL/L (ref 136–145)
WBC # BLD AUTO: 5.8 THOU/UL (ref 4.8–10.8)

## 2018-03-22 RX ADMIN — Medication SCH: at 21:58

## 2018-03-22 RX ADMIN — BUPROPION HYDROCHLORIDE SCH MG: 100 TABLET, EXTENDED RELEASE ORAL at 22:01

## 2018-03-22 RX ADMIN — Medication SCH ML: at 09:28

## 2018-03-22 RX ADMIN — BUPROPION HYDROCHLORIDE SCH MG: 100 TABLET, EXTENDED RELEASE ORAL at 09:27

## 2018-03-22 RX ADMIN — Medication SCH ML: at 22:06

## 2018-03-22 RX ADMIN — THERA TABS SCH TAB: TAB at 08:58

## 2018-03-22 NOTE — PDOC.PN
- Subjective


Encounter Start Date: 03/22/18


Encounter Start Time: 17:37





CC; dyspnea





sub: pt denies dyspnea, c/o some rt neck and shoulder pain for past several 

months





- Objective


Resuscitation Status: 


 











Resuscitation Status           FULL:Full Resuscitation














Vital Signs & Weight: 


 Vital Signs (12 hours)











  Temp Pulse Pulse Pulse Resp BP BP


 


 03/22/18 16:00  98.6 F      


 


 03/22/18 12:00  98.7 F      


 


 03/22/18 11:03    97  107 H    122/68


 


 03/22/18 09:28   94     97/54 L 


 


 03/22/18 08:58    100  96    141/59 H


 


 03/22/18 07:04  98.5 F  82    22 H  


 


 03/22/18 07:00  98.5 F      














  BP Pulse Ox Pulse Ox Pulse Ox


 


 03/22/18 16:00    


 


 03/22/18 12:00    


 


 03/22/18 11:03  152/60 H   


 


 03/22/18 09:28    


 


 03/22/18 08:58  117/68   93 L  99


 


 03/22/18 07:04   98  


 


 03/22/18 07:00    








 Weight











Admit Weight                   118 lb


 


Weight                         144 lb 13.499 oz











 Most Recent Monitor Data











Heart Rate from ECG            112


 


NIBP                           116/58


 


NIBP BP-Mean                   81


 


Respiration from ECG           23


 


SpO2                           100














I&O: 


 











 03/21/18 03/22/18 03/23/18





 06:59 06:59 06:59


 


Intake Total 773 4016 480


 


Output Total 755 2360 380


 


Balance 18 1656 100











Result Diagrams: 


 03/22/18 14:42





 03/22/18 05:00





Dx/Plan





- Plan








 Physical Examination


Constitutional: NAD


HEENT: moist MMs


Neck: no JVD


Respiratory: no wheezing, no rales, no rhonchi


Cardiovascular: no significant murmur, irregular


s1 s2 tachy, no gallop


Gastrointestinal: soft


Musculoskeletal: no edema


Neurological: non-focal


Psychiatric: normal affect, A&O x 3


Skin: no rash





Dx/Plan





- Plan








PT IS 63 YRS OLD MALE





1. Afib RVR


2. Alcohol abuse


3. HTN


4. HPL


5. DM type 2


6. Acute enteritis





Plan:





1. Appreciate cardio input. S/P ablation. hr stable at present


2. Continue prn ativan and folic acid. Mental status stable at present. Ct head 

no evidence of bleeding


3. Continue statin


4. monitor blood sugars closely. Will add insulin sliding scale.


5. Continue cipro and flagyl


6. on lovenox bid.  no OAC percardio due to noncomplaince and risk of falls. 





case d/w pt & RN

## 2018-03-22 NOTE — PRG
DATE OF SERVICE:  03/22/2018

 

SERVICE:  Pulmonary Medicine.

 

INTERVAL HISTORY:  The patient is doing fantastic this morning.  He is awake and alert.  He is orient
ed x2.  He is cooperative.  He is off of his sedation medication.  Otherwise, there has been no inter
anthony change to his condition.

 

PHYSICAL EXAMINATION:

VITAL SIGNS:  Afebrile, pulse 92, blood pressure 115/63, respirations 27, saturation 99% on room air.


GENERAL:  The patient is awake and alert, in no apparent distress.

HEENT:  Normocephalic, atraumatic.  Sclerae are white.  Conjunctivae pink.  Oral mucosa is moist with
out lesions.

LUNGS:  Decent air entry.  There is no prolonged expiratory phase present.

HEART:  Normal rate, regular.

ABDOMEN:  Soft, nontender, and nondistended.  Bowel sounds are positive.

MUSCULOSKELETAL:  No cyanosis or clubbing.  No pitting in the bilateral lower extremities.

NEUROLOGIC:  Grossly nonfocal.

 

LABORATORY DATA:  WBC 5.8, hemoglobin 7.5, and down trending.  Platelets 122,000.  Basic metabolic pr
ofile is completely unremarkable.  Bicarbonate is returning to the normal range.

 

IMAGING:  Echocardiogram demonstrates 60%-65% ejection fraction, normal right ventricular size and fu
nction.  Left atrium is normal.  He is back into a sinus rhythm.

 

ASSESSMENT:

1.  Delirium tremens.

2.  Alcohol abuse, heavy.

3.  Polysubstance drug abuse.

4.  Atrial fibrillation with rapid ventricular response, status post cardioversion and returned to si
nus rhythm.

5.  Dehydration, resolved.

6.  Colitis.

 

PLAN:  We will look for signs of bleeding.  We will repeat a hemoglobin tomorrow morning.  The patien
t has some chest wall discomfort on the left.  I did not see any obvious rib fracture on the CT of th
e abdomen and pelvis.  A left-sided rib series will be obtained.  In the meantime, Lidoderm patch tyson
l be placed.  We will work on mobilizing the patient today and get physical therapy involved.  He is 
stable for transition to the telemetry unit.

## 2018-03-22 NOTE — OP
DATE OF PROCEDURE:  03/21/2018

 

ELECTROPHYSIOLOGY STUDY AND RADIOFREQUENCY ABLATION REPORT

 

REFERRING PHYSICIAN:  eJff Nuñez M.D.

 

REASON FOR PROCEDURE:  Mr. Medellin is a 63-year-old male with prior history of recurrent atrial flutte
r with a remote history of bypass surgery and MIGUEL demonstrated occluded left atrial appendage with a 
surgical closure.  Normal LVEF.

 

Here for evaluation of his atrial flutter, which appears to be typical on EKG morphology and possible
 ablation.

 

DESCRIPTION OF PROCEDURE:  The patient received deep sedation by Anesthesia specialist.  After preppe
d, draped and anesthetizing a right femoral venous area on the ultrasound guidance, right femoral vei
n was accessed x2 and two 8 Tongan short sheath was introduced.  An 8 Tongan Decapolar catheter was a
dvanced to the CS and RV and right atrium and His bundle position, pacing, mapping and recording was 
performed in each location.  From the CS, overdrive pacing of the atrial flutter was attempted demons
trating shortest PPI by the CS suggestive of right atrial flutter in origin.  Following that, a Therm
oCool SF ST catheter was then advanced to the right atrium with 3D mapping of the right atrium was ob
tained and also his _____ was demarcated.  Following that, complete cavotricuspid isthmus block was d
emonstrated by post-pacing to being longest at the ablation line over 150 milliseconds.  Following th
at, basic EP study was performed revealing baseline cycle length 54 milliseconds, , HV 56, AH 1
40 milliseconds, QRS 63,  milliseconds.  Sinus node recovery time is 1042.  Corrected sinus nod
e recovery time was 351.  The AV Wenckebach was 460 milliseconds.  The ERP was 400 milliseconds noted
.  Concentric retrograde VA conduction was seen.  No dual AV latisha physiology was noted.

 

Burst atrial pacing revealed no inducible atrial flutter post-ablation.

 

The cardiac silhouette did not change throughout the procedure.

 

On dopamine, retesting of the cavotricuspid isthmus line was performed demonstrating no recurrent con
duction through the cavotricuspid isthmus or induction of the atrial flutter was achievable.

 

CONCLUSION:

1.  Typical atrial flutter depending on the cavotricuspid isthmus at baseline.

2.  Cavotricuspid isthmus ablation, eliminate atrial flutter, inducibility and also achieves complete
 cavotricuspid isthmus block.

3.  Normal sinus and AV latisha function.

4.  Normal HV parameters are seen.

 

PLAN:  At this point, stop amiodarone and also discontinue the anticoagulation, hence the demonstrati
on of left appendage surgical closure without a leak demonstrated on MIGUEL.

## 2018-03-22 NOTE — PQF
CLINICAL DOCUMENTATION IMPROVEMENT CLARIFICATION FORM:  ICD-10 Updated



PLEASE DO AN ADDENDUM TO THE PROGRESS NOTE WITH ANY DOCUMENTATION UPDATES OR 
ADDITIONS AND CARRY THROUGH TO DC SUMMARY.   THANK YOU.



Date:   3/22                                                              ATTN:
  DR. JAMISON CARRASCO / DR. NASIMA JANG



Please exercise your independent, professional judgment in responding to the 
clarification form. Clinical indicators are provided on the bottom of this form 
for your review.



Please check appropriate box(s):



[ x ] Protein Calorie Malnutrition:    [  ] Mild      [x  ] Moderate   [  ] 
Severe   

[  ] Other Malnutrition (please specify) _______________________________________
__

[  ] Underweight without malnutrition

[  ] Cachexia 

[  ] Other diagnosis ___________

[  ] Unable to determine





CLINICAL INDICATORS - SIGNS / SYMPTOMS / LABS



BMI:  16.0



ATTENDING PHYSICIAN H&P DOCUMENTATION 3/20:  HX OF PRESENT ILLNESS:  ...63-YEAR-
OLD MALE W/ 60 LB WEIGHT LOSS IN THE LAST 3-4 MONTHS PER DAUGHTER.  PHYSICAL 
EXAM:  GENERAL:  APPEARS VERY FRAIL & WEAK;  ASSESSMENT/PLAN:  6)  WEIGHT LOSS



EP CONSULT DOCUMENTATION 3/20:  PHYSICAL EXAMINATION:  GENERAL:  THE PATIENT IS 
MALNOURISHED; FRAIL & WEAK



DIETICIAN ASSESSMENT 3/21:  PATIENT TRIGGERED FOR WEIGHT LOSS, LOW BMI & 
DECREASED PO INTAKE; MILD TEMPORAL WASTING OBSERVED;  21% WEIGHT CHANGE IN 3 
MONTHS



RISK FACTORS:

WEIGHT LOSS

ALCOHOL ABUSE

DELIRIUM TREMENS

ALCOHOL INDUCED DEMENTIA

TOBACCO ABUSE



TREATMENT:

NUTRITION ASSESSMENT

NUTRITIONAL SUPPLEMENT (GLUCERNA TID W/MEALS)





Moderate Malnutrition (in acute illness)

   Energy Intake: <75% of estimated energy requirement for > 7 days

   Weight Loss:  1-2%/1 week;  5%/ 1 month; 7.5%/3 months

   Other: mild body fat loss; mild muscle mass loss; mild fluid accumulation; 

Severe Malnutrition (in acute illness)

   Energy Intake: < 50% of estimated energy requirement for > 5 days

   Weight Loss: >1-2%/1 week; >5%/1 month; >7.5%/3 months

   Other: moderate body fat loss; moderate muscle mass loss; moderate- severe 
fluid accumulation; measurably reduced  strength

Moderate Malnutrition (in chronic illness)

   Energy Intake: <75% of estimated energy requirement for >1 month

   Weight Loss: 5%/1 month; 7.5%/3 months; 10%/6 months; 20%/1 year

   Other: mild body fat loss; mild muscle mass loss; mild fluid accumulation

Severe Malnutrition (in chronic illness)

   Energy Intake: <75% of estimated energy requirement for >1 month

   Weight Loss: >5%/1 month; >7.5%/3 months; >10%/6 months; >20%/1 year

   Other: severe body fat loss; severe muscle mass loss; severe fluid 
accumulation; measurably reduced  strength





THANK YOU!  Mindy



(This form is maintained as a part of the permanent medical record)

 2015 [employer].  All Rights Reserved

Mindy June RN, BSN    [email or web address]    Office:  [phone]

                                                              

[facility] ClinicSUKHWINDER

## 2018-03-22 NOTE — CT
CT HEAAD WITHOUT CONTRAST:

 

Date:  03/22/18 

 

COMPARISON:  

None. 

 

HISTORY:  

Evaluate for stroke, atrial fibrillation. 

 

FINDINGS:

There is extensive periventricular deep and subcortical white matter hypodensity, evidence of small v
essel disease. The imaged paranasal sinuses/mastoid air cells are well aerated. There is no displaced
 calvarial fracture. 

 

There is no intracranial hemorrhage, midline shift, or mass effect. 

 

IMPRESSION: 

Extensive white matter hypodensity suggests small vessel disease. No intracranial hemorrhage is seen.
 If there is clinical concern for an acute infarction, brain MRI advised. 

 

 

POS: [facility]

## 2018-03-22 NOTE — PDOC.CTH
<Jailene Cruz - Last Filed: 03/22/18 09:24>





Cardiology Progress Note





- Subjective





EP progress note:





Patient seen and examined. No new cardiac complaints. Rested well overnight 

after ablation. No pain at right groin sheath site. No heart racing, chest pain

, palpitations, or dizziness. 





- Objective


 Vital Signs











  Temp Pulse Resp Pulse Ox


 


 03/22/18 07:04  98.5 F  82  22 H  98


 


 03/22/18 07:00  98.5 F   


 


 03/22/18 04:00  98.2 F   


 


 03/22/18 00:00  97.8 F   








 











Admit Weight                   118 lb


 


Weight                         144 lb 13.499 oz














 











 03/21/18 03/22/18 03/23/18





 06:59 06:59 06:59


 


Intake Total 773 4016 0


 


Output Total 755 2360 180


 


Balance 18 1656 -180














- Physical Examination


General/Neuro: alert & oriented x3, NAD


Neck: no JVD present


Lungs: unlabored respirations


Heart: PMI normal, RRR


Abdomen: NT/ND, soft





- Telemetry


Telemetry Rhythm: NSR





- Labs


Result Diagrams: 


 03/22/18 05:00





 03/22/18 05:00


 Troponin/CKMB











Troponin I  Less than  0.010 ng/mL (< 0.028)   03/20/18  03:17    














- Assessment/Plan





1. Typical atrial flutter s/p CTI ablation. maintaining NSR overnight. No 

recurrence of flutter. 


2. Previously on warfarin for recurrent AFib. Multiple recent falls and high 

risk for bleeding complications. Poor medication and monitoring compliance. 

Patient had JOSÉ surgically closed at time of bypass and MAZE.  MIGUEL on 3/21 

revealed appropriate closure and no residual flow or appendage. Ok to remain 

off OAC and continue with aspirin 81mg alone.


3. Anemia- Hgb decrease from 8.9 to 7.5, to be followed by medicine. No 

physical signs of bleeding post ablation. Recommend re-checking Hgb today. 


Will see as outpatient in 4-6 weeks for routine follow up. Ok for DC by EP when 

deemed medically stable 





<Montana Ortega - Last Filed: 03/22/18 15:48>





Cardiology Progress Note





- Objective


 Vital Signs











  Temp Pulse Pulse Pulse Resp BP BP


 


 03/22/18 12:00  98.7 F      


 


 03/22/18 09:28   94     97/54 L 


 


 03/22/18 08:58    100  96    141/59 H


 


 03/22/18 07:04  98.5 F  82    22 H  


 


 03/22/18 07:00  98.5 F      


 


 03/22/18 04:00  98.2 F      














  BP Pulse Ox Pulse Ox Pulse Ox


 


 03/22/18 12:00    


 


 03/22/18 09:28    


 


 03/22/18 08:58  117/68   93 L  99


 


 03/22/18 07:04   98  


 


 03/22/18 07:00    


 


 03/22/18 04:00    








 











Admit Weight                   118 lb


 


Weight                         144 lb 13.499 oz














 











 03/21/18 03/22/18 03/23/18





 06:59 06:59 06:59


 


Intake Total 773 4016 480


 


Output Total 755 2360 380


 


Balance 18 1656 100














- Labs


Result Diagrams: 


 03/22/18 14:42





 03/22/18 05:00


 Troponin/CKMB











Troponin I  Less than  0.010 ng/mL (< 0.028)   03/20/18  03:17    














Attending Addendum





- Attending Addendum


Date/Time: 03/22/18 8809





I personally evaluated the patient and discussed the management with MS Cruz.


I agree with the History, Examination, Assessment and Plan documented above 

with any addition or exceptions noted below.

## 2018-03-23 LAB
HGB BLD-MCNC: 8 G/DL (ref 14–18)
INR PPP: 1.7
PROTHROMBIN TIME: 20.3 SEC (ref 12–14.7)

## 2018-03-23 RX ADMIN — Medication SCH ML: at 09:24

## 2018-03-23 RX ADMIN — Medication SCH: at 22:09

## 2018-03-23 RX ADMIN — BUPROPION HYDROCHLORIDE SCH MG: 100 TABLET, EXTENDED RELEASE ORAL at 21:45

## 2018-03-23 RX ADMIN — Medication SCH ML: at 21:47

## 2018-03-23 RX ADMIN — BUPROPION HYDROCHLORIDE SCH MG: 100 TABLET, EXTENDED RELEASE ORAL at 09:22

## 2018-03-23 RX ADMIN — THERA TABS SCH TAB: TAB at 09:22

## 2018-03-23 NOTE — RAD
THREE VIEWS LEFT RIBS:

 

Date: 3-23-18 

 

Comparison: None. 

 

History: Left chest wall pain. 

 

FINDINGS: 

There are midline sternotomy wires and mediastinal clips. There is atherosclerotic calcification of t
he aortic arch. 

 

Three views of the left ribs demonstrate no displaced left sided rib fracture. 

 

IMPRESSION: 

No evidence of left sided rib fracture. If symptoms persist, follow up CT advised. 

 

POS: [facility]

## 2018-03-23 NOTE — PRG
DATE OF SERVICE:  03/23/2018

 

SERVICE:  Pulmonary Medicine.

 

INTERVAL HISTORY:  The patient is doing outstanding from a respiratory standpoint.  He denies any fev
ers, chills, nausea, vomiting or chest discomfort.  He is breathing comfortably.  Otherwise, there we
re no overnight events.  He went down for an x-ray this morning to look at his left chest wall.

 

PHYSICAL EXAMINATION:

VITAL SIGNS:  Afebrile, pulse 76, blood pressure 132/61, respirations 18 and saturation 97% on room a
ir.

GENERAL:  The patient is awake and alert, in no apparent distress.

LUNGS:  Decent air entry.  There is no prolonged expiratory phase, wheezing, rhonchi or crackles.

HEART:  Normal rate and regular.

ABDOMEN:  Soft, nontender and nondistended.  Bowel sounds are positive.

MUSCULOSKELETAL:  No cyanosis or clubbing.  There is no pitting in the bilateral lower extremities.

NEUROLOGIC:  Grossly nonfocal.

 

LABORATORY DATA:  Hemoglobin 8.0.

 

IMAGING DATA:

1.  Rib x-ray demonstrates no evidence of rib fracture.

2.  CT of the brain demonstrates no acute intracranial abnormality.

 

ASSESSMENT:

1.  Delirium tremens, resolving.

2.  Heavy alcohol abuse.

3.  Polysubstance drug abuse.

4.  Atrial fibrillation with rapid ventricular response, status post cardioversion and returned to si
nus rhythm.

5.  Colitis.

 

PLAN:  Pulmonary will continue to follow intermittently during this hospital stay.  If he gets into t
rouble over the weekend, please call Dr. Briones.  Otherwise, I will see him on Monday.  Lidoderm pat
ch is going to be discontinued as this has been ineffective controlling his discomfort.

## 2018-03-23 NOTE — PRG
DATE OF SERVICE:  03/23/2018 

 

SUBJECTIVE:  I am seeing Mr. Medellin at our [place] telemetry floor as electrophysiology follow up.
  He seems to be doing well on second day post-ablation.  No recurrent arrhythmias are noted.

 

OBJECTIVE:

VITAL SIGNS:  Blood pressure is 120/64, heart rate 76, respiration is 18, temperature 98.2 degrees Fa
hrenheit.

GENERAL:  Alert and oriented man, in no apparent distress.

NECK:  Supple.  Jugular veins not distended.

CHEST:  Coarse without crackles.

CARDIOVASCULAR:  Heart sounds are regular to rate and rhythm.  No murmur or gallop.

ABDOMEN:  Benign.  Bowel sounds positive.

EXTREMITIES:  _____ edema, clubbing or cyanosis.

 

DATABASE:  Telemetry strips reveal sinus rhythm, no ST-T changes.

 

LABORATORY DATA:  Hemoglobin 8, slightly increased from baseline of 8.9.  The telemetry strips reveal
s sinus rhythm.

 

ASSESSMENT AND PLAN:  Mr. Medellin is a 63-year-old man with a history of coronary artery disease, prio
r bypass surgery and left atrial appendage ligation, which continued to be without leak on MIGUEL yester
day.  He underwent a cavotricuspid isthmus ablation for typical atrial flutter, which eliminated the 
flutter and re-inducibility.  At this point, he is stable.  Routine follow up is planned.  His hemogl
obin decreased from baseline, mild decrease post-procedure but now trending up.  No other issues are 
noted.  His ETOH withdrawal symptoms are also diminishing.  Again, we will see him back as an outpati
ent.  For now off anticoagulation hence successful left appendage closure, again proven by MIGUEL.

## 2018-03-23 NOTE — PDOC.PN
- Subjective


Encounter Start Date: 03/23/18


Encounter Start Time: 08:25


Subjective: c/o left lower rib cage pain


-: no palp or sob, has gen aches and pain


-: is amb in room per patient, had 2 semisolid stools from yest





- Objective


Resuscitation Status: 


 











Resuscitation Status           FULL:Full Resuscitation














MAR Reviewed: Yes


Vital Signs & Weight: 


 Vital Signs (12 hours)











  Temp Pulse Resp BP Pulse Ox


 


 03/23/18 09:20  97.4 F L  91  16  141/65 H  97


 


 03/23/18 04:00  97.9 F  89  20  136/67  97








 Weight











Admit Weight                   118 lb


 


Weight                         148 lb 3 oz











 Most Recent Monitor Data











Heart Rate from ECG            97


 


NIBP                           123/65


 


NIBP BP-Mean                   77


 


Respiration from ECG           15


 


SpO2                           97














I&O: 


 











 03/22/18 03/23/18 03/24/18





 06:59 06:59 06:59


 


Intake Total 4016 1680 


 


Output Total 2360 755 


 


Balance 1656 925 











Result Diagrams: 


 03/23/18 04:38





 03/22/18 05:00





Phys Exam





- Physical Examination


HEENT: PERRLA, moist MMs


Neck: no JVD, supple


Respiratory: no wheezing, no rales


Cardiovascular: RRR, no significant murmur


Gastrointestinal: soft, non-tender, positive bowel sounds


Musculoskeletal: no edema, pulses present


Neurological: non-focal, moves all 4 limbs


Psychiatric: A&O x 3





Dx/Plan


(1) Atrial flutter


Code(s): I48.92 - UNSPECIFIED ATRIAL FLUTTER   Status: Resolved   


Qualifiers: 


   Atrial flutter type: typical   Qualified Code(s): I48.3 - Typical atrial 

flutter   


Comment: s/p ablation 3/21/2018   





(2) Enteritis


Code(s): K52.9 - NONINFECTIVE GASTROENTERITIS AND COLITIS, UNSPECIFIED   Status

: Acute   





(3) Moderate protein malnutrition


Code(s): E44.0 - MODERATE PROTEIN-CALORIE MALNUTRITION   Status: Chronic   





(4) FTT (failure to thrive) in adult


Status: Chronic   





(5) Alcohol abuse


Code(s): F10.10 - ALCOHOL ABUSE, UNCOMPLICATED   Status: Chronic   





(6) Chronic anemia


Code(s): D64.9 - ANEMIA, UNSPECIFIED   Status: Chronic   





(7) CAD (coronary artery disease)


Code(s): I25.10 - ATHSCL HEART DISEASE OF NATIVE CORONARY ARTERY W/O ANG PCTRS 

  Status: Chronic   


Qualifiers: 


   Coronary Disease-Associated Artery/Lesion type: bypass graft   Native vs. 

transplanted heart: native heart   Associated angina: without angina   

Qualified Code(s): I25.810 - Atherosclerosis of coronary artery bypass graft(s) 

without angina pectoris   





(8) HTN (hypertension)


Code(s): I10 - ESSENTIAL (PRIMARY) HYPERTENSION   Status: Chronic   


Qualifiers: 


   Hypertension type: essential hypertension   Qualified Code(s): I10 - 

Essential (primary) hypertension   





(9) Dyslipidemia


Code(s): E78.5 - HYPERLIPIDEMIA, UNSPECIFIED   Status: Chronic   





(10) Afib


Code(s): I48.91 - UNSPECIFIED ATRIAL FIBRILLATION   Status: Chronic   


Qualifiers: 


   Atrial fibrillation type: chronic   Qualified Code(s): I48.2 - Chronic 

atrial fibrillation   





- Plan


is on amiodarone


-: cipro and flagyl for enteritis, is resolving


-: dc plan in am if stable


-: rib xray shows no fracture


-: oral iron, encourage increase protein intake





* .








Review of Systems





- Medications/Allergies


Allergies/Adverse Reactions: 


 Allergies











Allergy/AdvReac Type Severity Reaction Status Date / Time


 


clonazepam [From Klonopin] Allergy   Verified 03/20/18 07:58


 


codeine Allergy   Verified 03/20/18 07:58


 


Penicillins Allergy   Verified 03/20/18 07:58


 


protamine Allergy   Verified 03/20/18 07:58











Medications: 


 Current Medications





Acetaminophen (Tylenol)  650 mg PO Q4H PRN


   PRN Reason: Mild Pain 1-3


   Last Admin: 03/23/18 11:38 Dose:  650 mg


Al Hydroxide/Mg Hydroxide (Maalox)  15 ml PO Q4H PRN


   PRN Reason: Heartburn  or Indigestion


Albuterol Sulfate (Proventil Hfa)  2 puff INH Q6HR PRN


   PRN Reason: SOB &/or Wheezing


Amiodarone HCl (Cordarone)  400 mg PO BID [facility]


   Last Admin: 03/23/18 09:22 Dose:  400 mg


Amlodipine Besylate (Norvasc)  2.5 mg PO DAILY [facility]


   Last Admin: 03/23/18 09:23 Dose:  2.5 mg


Aspirin (Aspirin)  325 mg PO DAILY [facility]


   Last Admin: 03/23/18 09:21 Dose:  325 mg


Bisacodyl (Dulcolax)  5 mg PO DAILYPRN PRN


   PRN Reason: CONSTIAPT


Bisacodyl (Dulcolax)  10 mg CA DAILYPRN PRN


   PRN Reason: Constipation


Bupropion HCl (Wellbutrin Sr)  200 mg PO BID [facility]


   Last Admin: 03/23/18 09:22 Dose:  200 mg


Ciprofloxacin (Cipro)  500 mg PO BID [facility]


   Last Admin: 03/23/18 09:22 Dose:  500 mg


Dextrose/Water (Dextrose 50%)  25 gm SLOW IVP PRN PRN


   PRN Reason: Hypoglycemia


Diphenhydramine HCl (Benadryl)  25 mg PO Q6H PRN


   PRN Reason: Itching


   Last Admin: 03/23/18 03:01 Dose:  25 mg


Enoxaparin Sodium (Lovenox)  50 mg SC 0900,2100 [facility]


   Last Admin: 03/23/18 09:23 Dose:  50 mg


Glucagon (Glucagon)  1 mg IM PRN PRN


   PRN Reason: Hypoglycemia


Dextrose/Water (D5w)  1,000 mls @ 0 mls/hr IV .Q0M PRN; As Directed


   PRN Reason: Hypoglycemia


Insulin Human Lispro (Humalog)  0 units SC .MODERATE SLIDING SC PRN


   PRN Reason: Moderate Correctional Scale


Insulin Human Lispro (Humalog)  0 units SC .BEDTIME SLIDING SC PRN


   PRN Reason: Bedtime Correctional Scale


Levothyroxine Sodium (Synthroid)  75 mcg PO 0600 [facility]


   Last Admin: 03/23/18 05:07 Dose:  75 mcg


Lidocaine (Lidoderm 5% Patch)  1 patch TD DAILY [facility]


   Last Admin: 03/23/18 09:23 Dose:  1 patch


Lorazepam (Ativan)  2 mg SLOW IVP Q15MIN PRN


   PRN Reason: Anxiety/Agitation


Lovastatin (Mevacor)  20 mg PO QPM-WM [facility]


   Last Admin: 03/22/18 16:46 Dose:  20 mg


Metoprolol Tartrate (Lopressor)  25 mg PO BID [facility]


   Last Admin: 03/23/18 09:23 Dose:  25 mg


Metronidazole (Flagyl)  500 mg PO TID [facility]


   Last Admin: 03/23/18 09:22 Dose:  500 mg


Miscellaneous Medication (Lidocaine Patch Removal)  1 each TOP 2100 [facility]


   Last Admin: 03/22/18 21:58 Dose:  Not Given


Montelukast Sodium (Singulair)  10 mg PO HS [facility]


   Last Admin: 03/22/18 22:03 Dose:  10 mg


Multivitamins (Theragran)  1 tab PO DAILY [facility]


   Last Admin: 03/23/18 09:22 Dose:  1 tab


Nitroglycerin (Nitrostat)  0.4 mg SL Q5MIN PRN


   PRN Reason: Chest Pain


Ondansetron HCl (Zofran)  4 mg IVP Q6H PRN


   PRN Reason: Nausea/Vomiting


Primidone (Mysoline)  50 mg PO BID [facility]


   Last Admin: 03/23/18 09:22 Dose:  50 mg


Silver Sulfadiazine (Silvadene)  0 gm TOP Q12H PRN


   PRN Reason: Rash/Topical Irritation


Sodium Chloride (Flush - Normal Saline)  10 ml IVF Q12HR [facility]


   Last Admin: 03/23/18 09:24 Dose:  10 ml


Sodium Chloride (Flush - Normal Saline)  10 ml IVF PRN PRN


   PRN Reason: Saline Flush


Temazepam (Restoril)  15 mg PO HSPRN PRN


   PRN Reason: Insomnia


   Last Admin: 03/22/18 22:14 Dose:  15 mg


Tramadol HCl (Ultram)  50 mg PO Q4H PRN


   PRN Reason: FOR MODERATE PAIN 4-6


   Last Admin: 03/23/18 09:32 Dose:  50 mg

## 2018-03-24 VITALS — DIASTOLIC BLOOD PRESSURE: 69 MMHG | TEMPERATURE: 97.7 F | SYSTOLIC BLOOD PRESSURE: 136 MMHG

## 2018-03-24 LAB
INR PPP: 1.5
PROTHROMBIN TIME: 18 SEC (ref 12–14.7)

## 2018-03-24 RX ADMIN — BUPROPION HYDROCHLORIDE SCH MG: 100 TABLET, EXTENDED RELEASE ORAL at 08:32

## 2018-03-24 RX ADMIN — THERA TABS SCH TAB: TAB at 08:33

## 2018-03-24 RX ADMIN — Medication SCH ML: at 08:33

## 2018-03-24 NOTE — PDOC.PN
- Subjective


Encounter Start Date: 03/24/18


Encounter Start Time: 09:30


Subjective: feels good, wants to go home


-: no palp, is amb in room and hallway





- Objective


Resuscitation Status: 


 











Resuscitation Status           FULL:Full Resuscitation














MAR Reviewed: Yes


Vital Signs & Weight: 


 Vital Signs (12 hours)











  Temp Pulse Pulse Resp BP BP BP


 


 03/24/18 11:30  97.6 F  73   12    130/66


 


 03/24/18 10:10    76   143/76 H  


 


 03/24/18 08:33   79     


 


 03/24/18 08:30  97.7 F  85   16   123/60 


 


 03/24/18 04:00  98.8 F  79   16   129/66 














  Pulse Ox Pulse Ox


 


 03/24/18 11:30  94 L 


 


 03/24/18 10:10   98


 


 03/24/18 08:33  


 


 03/24/18 08:30  97 


 


 03/24/18 04:00  97 








 Weight











Admit Weight                   118 lb


 


Weight                         148 lb 2 oz











 Most Recent Monitor Data











Heart Rate from ECG            97


 


NIBP                           123/65


 


NIBP BP-Mean                   77


 


Respiration from ECG           15


 


SpO2                           97














I&O: 


 











 03/23/18 03/24/18 03/25/18





 06:59 06:59 06:59


 


Intake Total 1680 1680 


 


Output Total 755 675 


 


Balance 925 1005 











Result Diagrams: 


 03/23/18 04:38





 03/22/18 05:00





Phys Exam





- Physical Examination


HEENT: PERRLA, moist MMs


Neck: no JVD, supple


Respiratory: no wheezing, no rales


rhonchi+


Cardiovascular: RRR, no significant murmur


Gastrointestinal: soft, non-tender, positive bowel sounds


Musculoskeletal: no edema, pulses present


Neurological: non-focal, moves all 4 limbs


Psychiatric: normal affect, A&O x 3





Dx/Plan


(1) Atrial flutter


Code(s): I48.92 - UNSPECIFIED ATRIAL FLUTTER   Status: Resolved   


Qualifiers: 


   Atrial flutter type: typical   Qualified Code(s): I48.3 - Typical atrial 

flutter   


Comment: s/p ablation 3/21/2018   





(2) Enteritis


Code(s): K52.9 - NONINFECTIVE GASTROENTERITIS AND COLITIS, UNSPECIFIED   Status

: Resolved   





(3) Moderate protein malnutrition


Code(s): E44.0 - MODERATE PROTEIN-CALORIE MALNUTRITION   Status: Chronic   





(4) FTT (failure to thrive) in adult


Status: Chronic   





(5) Alcohol abuse


Code(s): F10.10 - ALCOHOL ABUSE, UNCOMPLICATED   Status: Chronic   





(6) Chronic anemia


Code(s): D64.9 - ANEMIA, UNSPECIFIED   Status: Chronic   





(7) CAD (coronary artery disease)


Code(s): I25.10 - ATHSCL HEART DISEASE OF NATIVE CORONARY ARTERY W/O ANG PCTRS 

  Status: Chronic   


Qualifiers: 


   Coronary Disease-Associated Artery/Lesion type: bypass graft   Native vs. 

transplanted heart: native heart   Associated angina: without angina   

Qualified Code(s): I25.810 - Atherosclerosis of coronary artery bypass graft(s) 

without angina pectoris   





(8) HTN (hypertension)


Code(s): I10 - ESSENTIAL (PRIMARY) HYPERTENSION   Status: Chronic   


Qualifiers: 


   Hypertension type: essential hypertension   Qualified Code(s): I10 - 

Essential (primary) hypertension   





(9) Dyslipidemia


Code(s): E78.5 - HYPERLIPIDEMIA, UNSPECIFIED   Status: Chronic   





(10) Afib


Code(s): I48.91 - UNSPECIFIED ATRIAL FIBRILLATION   Status: Chronic   


Qualifiers: 


   Atrial fibrillation type: chronic   Qualified Code(s): I48.2 - Chronic 

atrial fibrillation   





- Plan


hemostable


-: dc pt home


-: amiodarone 400mg bidx7 days then 200mg daily thereafter


-: above was d/w 


-: d/w pt and family at bedside, no alc usage





* .

## 2018-03-24 NOTE — DIS
DATE OF ADMISSION:  03/20/2018

 

DATE OF DISCHARGE:  03/24/2018

 

DISCHARGE DISPOSITION:  To home.

 

PRIMARY DISCHARGE DIAGNOSES:  Atrial flutter, status post ablation done on 03/21
/2018; gastroenteritis, resolved; moderate protein malnutrition; failure to 
thrive with history of alcohol abuse; chronic anemia; coronary artery disease; 
hypertension; dyslipidemia; chronic atrial fibrillation.

 

PROCEDURES DONE DURING HOSPITALIZATION:  Echo with 2D Doppler showed an EF of 50
%-55%.  Transesophageal echo showed no thrombus in the cardiac chambers.  The 
patient has left atrial appendage, which is ligated, typical atrial flutter 
with cavotricuspid isthmus ablation done on 03/21/2018 by Dr. Montana Ortega, 
abdominal and pelvic CAT scan done on the day of admission showed findings of 
enteritis including terminal ileum and colitis.  H&H was 8 and 25.  PT and INR 
are 18 and 1.5.  Discharge BUN and creatinine is 7 and 0.6.  TSH was 6.15, 
albumin 3.3.  Troponin x2 is negative.  Urine drug screen was positive for 
opiates, barbiturates, benzodiazepines, and cannabinoids.

 

DISCHARGE MEDICATIONS:  Multaq 400 mg p.o. twice daily for 1 week, then 200 mg 
p.o. daily thereafter, albuterol nebulizer q.8 hours p.r.n., Norvasc 2.5 mg 
p.o. daily, aspirin 325 mg p.o. daily, bupropion 200 mg p.o. twice daily 
extended release, ferrous sulfate 325 mg p.o. twice daily, levothyroxine 75 mcg 
p.o. daily, lovastatin 20 mg p.o. daily, metoprolol 25 mg twice daily, 
Singulair 10 mg p.o. at bedtime, multivitamin 1 tab once daily, omeprazole 20 
mg daily, Primidone 50 mg p.o. twice daily, ropinirole 1 mg p.o. at bedtime, 
Spiriva inhaler 18 mcg daily, trazodone 100 mg p.o. at bedtime.

 

ALLERGIES:  Allergic to CLONAZEPAM, CODEINE, PENICILLIN, and PROTAMINE.

 

DISCHARGE PLAN:  Patient to follow up with primary care physician in one week 
and Dr. Montana Ortega as advised.

 

BRIEF COURSE DURING HOSPITALIZATION:  Patient initially got admitted with 
complaints of palpitations and generalized weakness.  He was found to be in 
atrial fibrillation/flutter with RVR.  The patient was essentially placed on 
Lovenox q.12 hours and Cardizem drip.  He has had consultation with Dr. Nuñez for Cardiology and Dr. Montana Ortega for Electrophysiology.  The patient 
has had transesophageal echo and transthoracic echo, both done, which has not 
revealed any intracardiac thrombus.  Subsequently, had ablation done for his 
atrial flutter.  He had mild alcohol withdrawal during his stay.  Prior to 
discharge, he is ambulating and eating well.  He was strongly counseled against 
any alcohol use.  No anticoagulants were given on discharge due to patient's 
risk of falls with prior history of alcohol abuse.  He has maintained sinus 
rhythm all through his stay post-ablation.  He will be shortly discharged home 
as Cardiology has cleared him for discharge.  Please see a face-to-face 
documentation on [facility] for the day of discharge. Please note he has large 
flat warts over perianal area and will need outpt surgical consultation via PCP 
when he is more stable and gained weight.

 

PARAM

## 2018-03-29 ENCOUNTER — HOSPITAL ENCOUNTER (EMERGENCY)
Dept: HOSPITAL 18 - NAV ERS | Age: 63
Discharge: HOME | End: 2018-03-29
Payer: COMMERCIAL

## 2018-03-29 DIAGNOSIS — F17.210: ICD-10-CM

## 2018-03-29 DIAGNOSIS — I48.91: ICD-10-CM

## 2018-03-29 DIAGNOSIS — G47.00: ICD-10-CM

## 2018-03-29 DIAGNOSIS — F41.9: ICD-10-CM

## 2018-03-29 DIAGNOSIS — Z79.82: ICD-10-CM

## 2018-03-29 DIAGNOSIS — D64.9: ICD-10-CM

## 2018-03-29 DIAGNOSIS — I10: ICD-10-CM

## 2018-03-29 DIAGNOSIS — E78.5: ICD-10-CM

## 2018-03-29 DIAGNOSIS — E11.40: ICD-10-CM

## 2018-03-29 DIAGNOSIS — J44.1: Primary | ICD-10-CM

## 2018-03-29 DIAGNOSIS — Z79.899: ICD-10-CM

## 2018-03-29 DIAGNOSIS — F10.97: ICD-10-CM

## 2018-03-29 DIAGNOSIS — E03.9: ICD-10-CM

## 2018-03-29 LAB
ALBUMIN SERPL BCG-MCNC: 3.2 G/DL (ref 3.4–4.8)
ALP SERPL-CCNC: 144 U/L (ref 40–150)
ALT SERPL W P-5'-P-CCNC: 31 U/L (ref 8–55)
ANION GAP SERPL CALC-SCNC: 12 MMOL/L (ref 10–20)
ANISOCYTOSIS BLD QL SMEAR: (no result) (100X)
AST SERPL-CCNC: 33 U/L (ref 5–34)
BASOPHILS # BLD AUTO: 0.1 THOU/UL (ref 0–0.2)
BASOPHILS NFR BLD AUTO: 0.8 % (ref 0–1)
BILIRUB SERPL-MCNC: 0.2 MG/DL (ref 0.2–1.2)
BUN SERPL-MCNC: 5 MG/DL (ref 8.4–25.7)
CALCIUM SERPL-MCNC: 8.9 MG/DL (ref 7.8–10.44)
CHLORIDE SERPL-SCNC: 103 MMOL/L (ref 98–107)
CK MB SERPL-MCNC: 2 NG/ML (ref 0–6.6)
CO2 SERPL-SCNC: 26 MMOL/L (ref 23–31)
CREAT CL PREDICTED SERPL C-G-VRATE: 0 ML/MIN (ref 70–130)
EOSINOPHIL # BLD AUTO: 0 THOU/UL (ref 0–0.7)
EOSINOPHIL NFR BLD AUTO: 0.1 % (ref 0–10)
GLOBULIN SER CALC-MCNC: 3 G/DL (ref 2.4–3.5)
GLUCOSE SERPL-MCNC: 96 MG/DL (ref 80–115)
HGB BLD-MCNC: 9.1 G/DL (ref 14–18)
LYMPHOCYTES # BLD AUTO: 0.9 THOU/UL (ref 1.2–3.4)
LYMPHOCYTES NFR BLD AUTO: 8.6 % (ref 21–51)
MCH RBC QN AUTO: 26.8 PG (ref 27–31)
MCV RBC AUTO: 83.7 FL (ref 80–94)
MDIFF COMPLETE?: YES
MICROCYTES BLD QL SMEAR: (no result) (100X)
MONOCYTES # BLD AUTO: 0.7 THOU/UL (ref 0.11–0.59)
MONOCYTES NFR BLD AUTO: 6.8 % (ref 0–10)
NEUTROPHILS # BLD AUTO: 8.7 THOU/UL (ref 1.4–6.5)
NEUTROPHILS NFR BLD AUTO: 83.7 % (ref 42–75)
PLATELET # BLD AUTO: 210 THOU/UL (ref 130–400)
POTASSIUM SERPL-SCNC: 3.9 MMOL/L (ref 3.5–5.1)
RBC # BLD AUTO: 3.38 MILL/UL (ref 4.7–6.1)
SODIUM SERPL-SCNC: 137 MMOL/L (ref 136–145)
STOMATOCYTES BLD QL SMEAR: (no result) (100X)
TROPONIN I SERPL DL<=0.01 NG/ML-MCNC: (no result) NG/ML (ref ?–0.03)
WBC # BLD AUTO: 10.4 THOU/UL (ref 4.8–10.8)

## 2018-03-29 PROCEDURE — 85025 COMPLETE CBC W/AUTO DIFF WBC: CPT

## 2018-03-29 PROCEDURE — 80053 COMPREHEN METABOLIC PANEL: CPT

## 2018-03-29 PROCEDURE — 94640 AIRWAY INHALATION TREATMENT: CPT

## 2018-03-29 PROCEDURE — 71046 X-RAY EXAM CHEST 2 VIEWS: CPT

## 2018-03-29 PROCEDURE — 96374 THER/PROPH/DIAG INJ IV PUSH: CPT

## 2018-03-29 PROCEDURE — 84484 ASSAY OF TROPONIN QUANT: CPT

## 2018-03-29 PROCEDURE — 96361 HYDRATE IV INFUSION ADD-ON: CPT

## 2018-03-29 PROCEDURE — 82553 CREATINE MB FRACTION: CPT

## 2018-03-29 PROCEDURE — 93005 ELECTROCARDIOGRAM TRACING: CPT

## 2018-03-29 PROCEDURE — 96375 TX/PRO/DX INJ NEW DRUG ADDON: CPT

## 2018-03-29 PROCEDURE — 94760 N-INVAS EAR/PLS OXIMETRY 1: CPT

## 2018-03-29 NOTE — RAD
PA AND LATERAL CHEST:

 

INDICATIONS:

Headache.  Nausea.  Muscle soreness.  Left-sided rib pain.

 

FINDINGS:

The lungs are clear.  There is post surgical change from prior CABG.  No acute osseous abnormality is
 evident.  The right costophrenic angle is excluded.

 

IMPRESSION:

No acute cardiopulmonary abnormality.

 

POS: [facility]

## 2018-04-01 ENCOUNTER — HOSPITAL ENCOUNTER (INPATIENT)
Dept: HOSPITAL 92 - ERS | Age: 63
LOS: 5 days | Discharge: HOME HEALTH | DRG: 189 | End: 2018-04-06
Attending: INTERNAL MEDICINE | Admitting: INTERNAL MEDICINE
Payer: COMMERCIAL

## 2018-04-01 VITALS — BODY MASS INDEX: 20.9 KG/M2

## 2018-04-01 LAB
ALBUMIN SERPL BCG-MCNC: 3.2 G/DL (ref 3.4–4.8)
ALP SERPL-CCNC: 124 U/L (ref 40–150)
ALT SERPL W P-5'-P-CCNC: 69 U/L (ref 8–55)
ANALYZER IN CARDIO: (no result)
ANION GAP SERPL CALC-SCNC: 11 MMOL/L (ref 10–20)
ANISOCYTOSIS BLD QL SMEAR: (no result) (100X)
APTT PPP: 29.3 SEC (ref 22.9–36.1)
AST SERPL-CCNC: 91 U/L (ref 5–34)
BASE EXCESS STD BLDA CALC-SCNC: 9.8 MEQ/L
BASOPHILS # BLD AUTO: 0.1 THOU/UL (ref 0–0.2)
BASOPHILS NFR BLD AUTO: 0.6 % (ref 0–1)
BICARBONATE (HCO3V): 35.2 MMOL/L (ref 1–85)
BILIRUB SERPL-MCNC: 0.3 MG/DL (ref 0.2–1.2)
BUN SERPL-MCNC: 15 MG/DL (ref 8.4–25.7)
CA-I BLD-SCNC: 1.03 MMOL/L (ref 1.12–1.32)
CA-I BLDA-SCNC: 1.2 MMOL/L (ref 1.12–1.3)
CALCIUM SERPL-MCNC: 8.9 MG/DL (ref 7.8–10.44)
CHLORIDE SERPL-SCNC: 100 MMOL/L (ref 98–113)
CHLORIDE SERPL-SCNC: 97 MMOL/L (ref 98–107)
CK MB SERPL-MCNC: 1.9 NG/ML (ref 0–6.6)
CO2 BLDV CALC-SCNC: 36.9 MMOL/L (ref 1–85)
CO2 SERPL-SCNC: 34 MMOL/L (ref 23–31)
CO2 TENSION (PVCO2): 55.1 MMHG (ref 41–51)
CREAT CL PREDICTED SERPL C-G-VRATE: 0 ML/MIN (ref 70–130)
EOSINOPHIL # BLD AUTO: 0 THOU/UL (ref 0–0.7)
EOSINOPHIL NFR BLD AUTO: 0.1 % (ref 0–10)
GLOBULIN SER CALC-MCNC: 2.7 G/DL (ref 2.4–3.5)
GLUCOSE SERPL-MCNC: 160 MG/DL (ref 70–105)
GLUCOSE SERPL-MCNC: 162 MG/DL (ref 80–115)
HCO3 BLDA-SCNC: 36.1 MEQ/L (ref 22–26)
HCT VFR BLD CALC: 31 % (ref 36–52)
HCT VFR BLDA CALC: 33.2 % (ref 42–52)
HEMOGLOBIN - CALC: 10.6 G/DL (ref 12–18)
HGB BLD-MCNC: 9.1 G/DL (ref 14–18)
HGB BLDA-MCNC: 9 G/DL (ref 14–18)
INR PPP: 1.1
LIPASE SERPL-CCNC: 20 U/L (ref 8–78)
LYMPHOCYTES # BLD: 0.6 THOU/UL (ref 1.2–3.4)
LYMPHOCYTES NFR BLD AUTO: 6.8 % (ref 21–51)
MAGNESIUM SERPL-MCNC: 1.7 MG/DL (ref 1.6–2.6)
MCH RBC QN AUTO: 27.6 PG (ref 27–31)
MCV RBC AUTO: 89 FL (ref 80–94)
MDIFF COMPLETE?: YES
MONOCYTES # BLD AUTO: 0.3 THOU/UL (ref 0.11–0.59)
MONOCYTES NFR BLD AUTO: 3.5 % (ref 0–10)
NEUTROPHILS # BLD AUTO: 8.1 THOU/UL (ref 1.4–6.5)
NEUTROPHILS NFR BLD AUTO: 89.1 % (ref 42–75)
O2 TENSION (PVO2): 28 MMHG (ref 35–45)
PCO2 BLDA: 60.4 MMHG (ref 35–45)
PH BLDA: 7.39 [PH] (ref 7.35–7.45)
PLATELET # BLD AUTO: 211 THOU/UL (ref 130–400)
PLATELET BLD QL SMEAR: (no result)
PO2 BLDA: 51.5 MMHG (ref 80–100)
POTASSIUM SERPL-SCNC: 4.5 MMOL/L (ref 3.5–5.1)
POTASSIUM SERPL-SCNC: 5.5 MMOL/L (ref 3.4–4.7)
PROTHROMBIN TIME: 14.3 SEC (ref 12–14.7)
RBC # BLD AUTO: 3.29 MILL/UL (ref 4.7–6.1)
SAO2 % BLDV FROM PO2: 51.8 % (ref 94–98)
SODIUM SERPL-SCNC: 137 MMOL/L (ref 136–145)
SODIUM SERPL-SCNC: 141 MMOL/L (ref 138–145)
SPECIMEN DRAWN FROM PATIENT: (no result)
TROPONIN I SERPL DL<=0.01 NG/ML-MCNC: (no result) NG/ML (ref ?–0.03)
TROPONIN I SERPL DL<=0.01 NG/ML-MCNC: (no result) NG/ML (ref ?–0.03)
WBC # BLD AUTO: 9.1 THOU/UL (ref 4.8–10.8)

## 2018-04-01 PROCEDURE — 80053 COMPREHEN METABOLIC PANEL: CPT

## 2018-04-01 PROCEDURE — 84484 ASSAY OF TROPONIN QUANT: CPT

## 2018-04-01 PROCEDURE — 87206 SMEAR FLUORESCENT/ACID STAI: CPT

## 2018-04-01 PROCEDURE — 80048 BASIC METABOLIC PNL TOTAL CA: CPT

## 2018-04-01 PROCEDURE — 36415 COLL VENOUS BLD VENIPUNCTURE: CPT

## 2018-04-01 PROCEDURE — 96375 TX/PRO/DX INJ NEW DRUG ADDON: CPT

## 2018-04-01 PROCEDURE — 80076 HEPATIC FUNCTION PANEL: CPT

## 2018-04-01 PROCEDURE — 83735 ASSAY OF MAGNESIUM: CPT

## 2018-04-01 PROCEDURE — 82553 CREATINE MB FRACTION: CPT

## 2018-04-01 PROCEDURE — 87040 BLOOD CULTURE FOR BACTERIA: CPT

## 2018-04-01 PROCEDURE — 82330 ASSAY OF CALCIUM: CPT

## 2018-04-01 PROCEDURE — 83880 ASSAY OF NATRIURETIC PEPTIDE: CPT

## 2018-04-01 PROCEDURE — A4216 STERILE WATER/SALINE, 10 ML: HCPCS

## 2018-04-01 PROCEDURE — 82803 BLOOD GASES ANY COMBINATION: CPT

## 2018-04-01 PROCEDURE — 85610 PROTHROMBIN TIME: CPT

## 2018-04-01 PROCEDURE — 80306 DRUG TEST PRSMV INSTRMNT: CPT

## 2018-04-01 PROCEDURE — 80307 DRUG TEST PRSMV CHEM ANLYZR: CPT

## 2018-04-01 PROCEDURE — 96374 THER/PROPH/DIAG INJ IV PUSH: CPT

## 2018-04-01 PROCEDURE — 93005 ELECTROCARDIOGRAM TRACING: CPT

## 2018-04-01 PROCEDURE — 94640 AIRWAY INHALATION TREATMENT: CPT

## 2018-04-01 PROCEDURE — 83605 ASSAY OF LACTIC ACID: CPT

## 2018-04-01 PROCEDURE — 36416 COLLJ CAPILLARY BLOOD SPEC: CPT

## 2018-04-01 PROCEDURE — 82805 BLOOD GASES W/O2 SATURATION: CPT

## 2018-04-01 PROCEDURE — 71045 X-RAY EXAM CHEST 1 VIEW: CPT

## 2018-04-01 PROCEDURE — 85025 COMPLETE CBC W/AUTO DIFF WBC: CPT

## 2018-04-01 PROCEDURE — 71275 CT ANGIOGRAPHY CHEST: CPT

## 2018-04-01 PROCEDURE — 84443 ASSAY THYROID STIM HORMONE: CPT

## 2018-04-01 PROCEDURE — 76705 ECHO EXAM OF ABDOMEN: CPT

## 2018-04-01 PROCEDURE — 87116 MYCOBACTERIA CULTURE: CPT

## 2018-04-01 PROCEDURE — 85730 THROMBOPLASTIN TIME PARTIAL: CPT

## 2018-04-01 PROCEDURE — 83690 ASSAY OF LIPASE: CPT

## 2018-04-01 NOTE — CT
CTA OF THE THORAX UTILIZING IV CONTRAST AND 3D REFORMATTED IMAGING:

 

Indication: History of chest pain with shortness of breath and food intolerance for the last two days
 after having an ablation and being discharged from the hospital. Patient also reports nausea. Patirah joy is normally on oxygen at home and he is a smoker with history of asthma. 

 

Comparison: No CT comparisons of the thorax are available. Comparison is made with CT of the abdomen/
pelvis dated 3-20-18. 

 

FINDINGS: 

There is scattered emphysema. There are patchy areas of nodular ground glass opacities seen predomina
tely in a bronchial distribution seen within the right upper lobe, left upper lobe and both lower lob
es with small bilateral pleural effusions and bibasilar atelectasis. No central or segmental pulmonar
y embolus is evident. There is post-surgical change of a prior CABG. There is scattered vascular calc
ification of the coronary arteries and thoracic aorta. There is a 1 cm pretracheal lymph node seen on
 image 30 of series 2. Subcarinal lymph node is seen measuring 8 mm. No additional pathologically enl
arged lymph nodes evident. 

 

Visualized upper abdomen demonstrates moderate calcification of the visualized abdominal aorta. Jejun
al veins appear within normal limits. There is some density within the gallbladder which may reflect 
gallbladder sludge. There are calcified granuloma within the liver. 

 

IMPRESSION: 

1. No central or segmental pulmonary embolus. 

2. Ground glass nodular opacities seen predominately in a bronchial distribution, suspicious for a br
onchiolitis of either infectious or inflammatory etiology. There is a mildly prominent pretracheal me
diastinal lymph node measuring 1 cm. 

3. Small bilateral pleural effusions. 

4. Density within the gallbladder may reflect gallbladder sludge. No visible stones were seen on the 
comparison CT examination dated 3-20-18. 

 

POS: [facility]

## 2018-04-01 NOTE — RAD
CHEST ONE VIEW:

 

History: Cough. 

 

Comparison: 3-29-18

 

FINDINGS: 

Mild increased interstitial markings in the lung bases. No pneumothorax. Cardiomediastinal silhouette
 and mediastinal contours are within normal limits. 

 

IMPRESSION: 

Mild interstitial edema. 

 

POS: [facility]

## 2018-04-02 LAB
DRUG SCREEN CUTOFF: (no result)
MEDTOX CONTROL LINE VALID?: (no result)
MEDTOX READER #: (no result)
TROPONIN I SERPL DL<=0.01 NG/ML-MCNC: (no result) NG/ML (ref ?–0.03)

## 2018-04-02 RX ADMIN — INSULIN LISPRO PRN UNIT: 100 INJECTION, SOLUTION INTRAVENOUS; SUBCUTANEOUS at 23:47

## 2018-04-02 RX ADMIN — INSULIN LISPRO PRN UNIT: 100 INJECTION, SOLUTION INTRAVENOUS; SUBCUTANEOUS at 17:54

## 2018-04-02 RX ADMIN — INSULIN LISPRO PRN UNIT: 100 INJECTION, SOLUTION INTRAVENOUS; SUBCUTANEOUS at 13:54

## 2018-04-02 NOTE — ULT
ULTRASOUND OF SPLEEN:

A limited abdominal ultrasound was performed to assess the spleen.

 

INDICATION: 

Assess spleen size.

 

FINDINGS: 

Maximal spleen measurement is recorded at 13.2 cm, which is upper normal.  Spleen width is recorded a
t 4 cm, which is within normal range.

 

IMPRESSION: 

Spleen size upper normal.

 

POS: [facility]

## 2018-04-02 NOTE — CON
DATE OF CONSULTATION:  04/02/2018

 

Gustavo Medellin is a 63-year-old gentleman admitted with a cough, left-sided chest pain.  His sats in the
 ER on room air 91%, blood pressure 130/60, pulse 72, respiration 18.  He is still smoking.  Lives wi
th a brother apparently, according to his son is in the room.  The patient has not eaten for 5 days. 
 He has a history of previous alcohol and tobacco abuse.  He has severe limitation to activity.  Acco
rding to the son the patient has been lying in bed now for the last several days.  There is no fever 
or chills.

 

His history is rather sketchy.

 

Regarding his left-sided pain, it is possibly he could have fallen, but denies any nausea, vomiting o
r diarrhea.  Two days ago he had a BM.

 

PAST MEDICAL HISTORY:  Pertinent for probably dementia, cardiac arrhythmia, atrial fibrillation, diab
etes, alcohol abuse, tobacco abuse, COPD, hyperlipidemia.

 

PAST SURGICAL HISTORY:  Bypass 2013, right knee surgery, appendix.  

 

TOBACCO;  Tobacco at least half pack a day.  

 

ALCOHOL:  Apparently quit drinking maybe 2 weeks ago or so.

 

MEDICATIONS FROM HOME:  Zofran, prednisone and albuterol, nitroglycerin, amlodipine 2.5, Singulair, R
opinirole 1 mg, Primidone 50, vitamins, omeprazole 40, lovastatin 20, Symbicort, metoprolol.

 

ALLERGIES:  PENICILLIN and CODEINE.

 

SOCIAL/FAMILY HISTORY:  Presently disabled.  Lives with a brother.

 

REVIEW OF SYSTEMS:  Otherwise, 10-point negative.

 

PHYSICAL EXAMINATION:

VITAL SIGNS:  Sats are 90 on 3 liters, temperature 96, pulse 117, blood pressure 120/58.

CHEST:  Chest revealed decreased breath sounds.  Expiration prolonged.  There is no significant wheez
ing.

CARDIAC:  Normal S1, S2.  No gallops.

ABDOMEN:  Soft.  No masses.  

 

LABORATORY:  His toxicology reveals benzos, marijuana and barbiturates.  

 

His CT chest angio done yesterday showing ground glass opacity left lung, but no obvious infiltrates 
or mass was seen.  

 

White count 9000, H&H 9 and 29, platelet count was normal.  His chemistry shows a normal BUN and crea
tinine, pO2 51, pCO2 67.39, apparently on room air showed severe hypoxemia with respiratory acidosis.
  .

 

IMPRESSION:

1.  Respiratory failure, multiple etiologies, chronic obstructive pulmonary disease exacerbation, pos
sible superimposed congestive heart failure.

2.  Possible pneumonia.

3.  Alcohol abuse.

4.  Tobacco abuse.

5.  Severe deconditioning.

6.  Dementia.

 

PLAN:  He was started on steroids, neb treatments, antibiotics are continued.  Will notify Dr. Beverley alex who has seen him in the past.

 

Continue PT.  May need GI workup.

 

Question whether his eating habits are due to his severe dementia.

 

He may need nursing home placement.  

 

This is a consultation, 70 minutes, 50% spent in direct patient care.

## 2018-04-02 NOTE — PDOC.PN
- Subjective


Encounter Start Date: 04/02/18


Encounter Start Time: 13:00


Subjective: nsg notes rev, michelle ovn, no new c/o overall feels breathing is better


-: son at bedside. reports poor appetite; not hungry and eating makes him 


-: short of breath





- Objective


Vital Signs & Weight: 


 Vital Signs (12 hours)











  Temp Pulse Resp BP Pulse Ox


 


 04/02/18 20:00  98.1 F  85  18  129/61 


 


 04/02/18 18:28      93 L


 


 04/02/18 18:27   87  18   93 L


 


 04/02/18 15:00  98.2 F  81  15  121/63  94 L


 


 04/02/18 13:40   79  18   92 L


 


 04/02/18 12:00  98.5 F  76  15  116/60  92 L








 Weight











Weight                         142 lb














I&O: 


 











 04/01/18 04/02/18 04/03/18





 06:59 06:59 06:59


 


Intake Total   940


 


Output Total   750


 


Balance   190











Result Diagrams: 


 04/01/18 14:00





 04/01/18 15:04


Additional Labs: 


 Accuchecks











  04/02/18 04/02/18 04/02/18





  21:06 16:50 12:23


 


POC Glucose  238 H  214 H  266 H














  04/02/18 04/01/18





  05:35 23:12


 


POC Glucose  179 H  218 H














Phys Exam





- Physical Examination


Constitutional: NAD


seated in hospital bed


HEENT: PERRLA, sclera anicteric


Respiratory: no rales, no rhonchi


diminished throughout with faint end expiratory wheezing 


Cardiovascular: RRR, no significant murmur, no rub


Gastrointestinal: soft, non-tender, positive bowel sounds


Musculoskeletal: no edema, pulses present


Neurological: moves all 4 limbs


Psychiatric: normal affect, A&O x 3





Dx/Plan





- Plan





* 63M hx recent ablation p/w CC SOB


* 


* SOB concern for COPD exacerbation


 * O2 supplementation


 * apprec pulmonary c/s


 * initial abx selection 2/2 desire to avoid potential QTc prolongation agents 

and pt having a pcn allergy


 * steroids with anticipated medication induced hyperglycemia, SSI


 


 hx cardiac ablation for atrial flutter


 * see discussion above re: abx selection


 


 hx tobacco and etoh use


 * patient's children have been living with him since d/c and he has not had 

any etoh or illicit drugs since d/c


 * additionally, they have been helping to administer his medications to him





diet: as estefani, cardiac


activity: as estefani, PT c/s


dvt ppx








Review of Systems





- Medications/Allergies


Allergies/Adverse Reactions: 


 Allergies











Allergy/AdvReac Type Severity Reaction Status Date / Time


 


clonazepam [From Klonopin] Allergy   Verified 03/20/18 07:58


 


codeine Allergy   Verified 03/20/18 07:58


 


Penicillins Allergy   Verified 03/20/18 07:58


 


protamine Allergy   Verified 03/20/18 07:58











Medications: 


 Current Medications





Albuterol/Ipratropium (Duoneb)  3 ml NEB Q4H PRN


   PRN Reason: SOB &/or Wheezing


Albuterol/Ipratropium (Duoneb)  3 ml NEB [id number] [facility]


   Last Admin: 04/03/18 12:15 Dose:  3 ml


Amlodipine Besylate (Norvasc)  2.5 mg PO DAILY [facility]


Aspirin (Aspirin)  325 mg PO DAILY [facility]


Bupropion HCl (Wellbutrin Sr)  200 mg PO BID [facility]


Dextrose/Water (Dextrose 50%)  25 gm SLOW IVP PRN PRN


   PRN Reason: Hypoglycemia


Ferrous Sulfate (Feosol)  325 mg PO DAILY [facility]


Glucagon (Glucagon)  1 mg IM PRN PRN


   PRN Reason: Hypoglycemia


Guaifenesin (Mucinex)  600 mg PO Q12HR [facility]


Heparin Sodium (Porcine) (Heparin)  5,000 units SC TID [facility]


   Last Admin: 04/03/18 15:59 Dose:  5,000 units


Dextrose/Water (D5w)  1,000 mls @ 0 mls/hr IV .Q0M PRN; As Directed


   PRN Reason: Hypoglycemia


Doxycycline Hyclate 100 mg/ (Sodium Chloride)  100 mls @ 100 mls/hr IVPB 1100,

2300 [facility]


   Last Admin: 04/03/18 11:51 Dose:  100 mls


Insulin Human Lispro (Humalog)  0 units SC .MILD SLIDING SCALE PRN


   PRN Reason: Mild Correctional Scale


   Last Admin: 04/03/18 18:34 Dose:  3 unit


Levothyroxine Sodium (Synthroid)  75 mcg PO 0600 [facility]


   Last Admin: 04/03/18 05:29 Dose:  75 mcg


Methylprednisolone Sodium Succinate (Solu-Medrol)  40 mg IVP Q6HR [facility]


   Last Admin: 04/03/18 18:26 Dose:  40 mg


Metoprolol Tartrate (Lopressor)  25 mg PO BID [facility]


Montelukast Sodium (Singulair)  10 mg PO HS [facility]


Nitroglycerin (Nitrostat)  0.4 mg SL Q5MIN PRN


   PRN Reason: Chest Pain


   Last Admin: 04/03/18 17:18 Dose:  0.4 mg


Ondansetron HCl (Zofran Odt)  4 mg PO Q6H PRN


   PRN Reason: Nausea/Vomiting


Pantoprazole Sodium (Protonix)  40 mg PO DAILY [facility]


Primidone (Mysoline)  50 mg PO BID [facility]


Ropinirole HCl (Requip)  1 mg PO HS [facility]


Simvastatin (Zocor)  10 mg PO HS [facility]


Sodium Chloride (Flush - Normal Saline)  10 ml IVF Q12HR [facility]


Sodium Chloride (Flush - Normal Saline)  10 ml IVF PRN PRN


   PRN Reason: Saline Flush


Tramadol HCl (Ultram)  50 mg PO Q6H PRN


   PRN Reason: Moderate Pain (4-6)


   Last Admin: 04/03/18 15:57 Dose:  50 mg


Trazodone HCl (Desyrel)  100 mg PO HS [facility]

## 2018-04-03 RX ADMIN — HEPARIN SODIUM SCH UNITS: 5000 INJECTION, SOLUTION INTRAVENOUS; SUBCUTANEOUS at 19:56

## 2018-04-03 RX ADMIN — INSULIN LISPRO PRN UNIT: 100 INJECTION, SOLUTION INTRAVENOUS; SUBCUTANEOUS at 11:48

## 2018-04-03 RX ADMIN — INSULIN LISPRO PRN UNIT: 100 INJECTION, SOLUTION INTRAVENOUS; SUBCUTANEOUS at 18:34

## 2018-04-03 RX ADMIN — Medication SCH ML: at 19:57

## 2018-04-03 RX ADMIN — BUPROPION HYDROCHLORIDE SCH MG: 100 TABLET, EXTENDED RELEASE ORAL at 22:00

## 2018-04-03 RX ADMIN — HEPARIN SODIUM SCH UNITS: 5000 INJECTION, SOLUTION INTRAVENOUS; SUBCUTANEOUS at 15:59

## 2018-04-03 NOTE — PRG
DATE OF SERVICE:  04/03/2018

 

Mr. Medellin said he has been feeling better.  

 

PHYSICAL EXAMINATION: 

VITAL SIGNS:  His sats are 92 on 3 liters, temperature 98, pulse 89, respiration rate 18.

CHEST:  Chest reveals decreased breath sounds without any wheezing.

CARDIAC:  Normal S1, S2.

ABDOMEN:  Soft, no  mass.

 

IMPRESSION:

1.  Severe chronic obstructive pulmonary disease.

2.  Severe deconditioning.

3.  Tobacco abuse, ongoing.

 

PLAN:  From a pulmonary standpoint of view, continue neb treatments, steroids.

 

Refrain from smoking.

 

Hopefully, we can discharge home in the next several days.  Taper his steroids.

## 2018-04-03 NOTE — PDOC.PN
- Subjective


Encounter Start Date: 04/03/18


Encounter Start Time: 12:37


Subjective: no complaints today. Feels better.


-: No acute event overnight. 





- Objective


MAR Reviewed: Yes


Vital Signs & Weight: 


 Vital Signs (12 hours)











  Temp Pulse Resp BP Pulse Ox


 


 04/03/18 12:15   86  16   91 L


 


 04/03/18 07:34   89  18  


 


 04/03/18 04:00  98.2 F  82  20  137/72  92 L


 


 04/03/18 00:58      93 L








 Weight











Weight                         142 lb














I&O: 


 











 04/02/18 04/03/18 04/04/18





 06:59 06:59 06:59


 


Intake Total  940 


 


Output Total  750 


 


Balance  190 











Result Diagrams: 


 04/01/18 14:00





 04/01/18 15:04


Additional Labs: 


 Accuchecks











  04/03/18 04/03/18 04/02/18





  10:17 06:03 21:06


 


POC Glucose  256 H  170 H  238 H














  04/02/18 04/02/18





  16:50 12:23


 


POC Glucose  214 H  266 H














Phys Exam





- Physical Examination


Constitutional: NAD


HEENT: PERRLA, moist MMs, sclera anicteric


Neck: no nodes, no JVD, supple, full ROM


Respiratory: no rales, no rhonchi, wheezing present


Cardiovascular: RRR, no significant murmur, no rub


Gastrointestinal: soft, non-tender, no distention, positive bowel sounds


Musculoskeletal: no edema, pulses present


Neurological: non-focal, moves all 4 limbs


Psychiatric: normal affect, A&O x 3





Dx/Plan


(1) Acute respiratory failure


Code(s): J96.00 - ACUTE RESPIRATORY FAILURE, UNSP W HYPOXIA OR HYPERCAPNIA   

Status: Acute   


Qualifiers: 


   Respiratory failure complication: hypercapnia   Qualified Code(s): J96.02 - 

Acute respiratory failure with hypercapnia   


Comment: Improving. Likely 2/2 COPD exacerbation and PNA. Will continue nebs 

and steroids. Will be tapered off.    





(2) COPD exacerbation


Code(s): J44.1 - CHRONIC OBSTRUCTIVE PULMONARY DISEASE W (ACUTE) EXACERBATION   

Status: Acute   


Plan: 


As above. 











(3) Afib


Code(s): I48.91 - UNSPECIFIED ATRIAL FIBRILLATION   Status: Chronic   


Qualifiers: 


   Atrial fibrillation type: chronic   Qualified Code(s): I48.2 - Chronic 

atrial fibrillation   


Comment: Rate controlled. Will resume home regimen and monitor heart rate 

closely. Hold amiodarone for now. 


   





(4) Alcohol abuse


Code(s): F10.10 - ALCOHOL ABUSE, UNCOMPLICATED   Status: Chronic   Comment: 

Counseled.    





(5) CAD (coronary artery disease)


Code(s): I25.10 - ATHSCL HEART DISEASE OF NATIVE CORONARY ARTERY W/O ANG PCTRS 

  Status: Chronic   


Qualifiers: 


   Coronary Disease-Associated Artery/Lesion type: bypass graft   Native vs. 

transplanted heart: native heart   Associated angina: without angina   

Qualified Code(s): I25.810 - Atherosclerosis of coronary artery bypass graft(s) 

without angina pectoris   


Comment: Stable. Chest pain free. Continued on home medications.    





(6) Dyslipidemia


Code(s): E78.5 - HYPERLIPIDEMIA, UNSPECIFIED   Status: Chronic   Comment: On 

Statins   





(7) HTN (hypertension)


Code(s): I10 - ESSENTIAL (PRIMARY) HYPERTENSION   Status: Chronic   


Qualifiers: 


   Hypertension type: essential hypertension   Qualified Code(s): I10 - 

Essential (primary) hypertension   


Comment: Controlled and at goal. Continue current medications    





(8) Moderate protein malnutrition


Code(s): E44.0 - MODERATE PROTEIN-CALORIE MALNUTRITION   Status: Chronic   


Plan: 


Ensure adequate nutritional intake. 











- Plan


cont current plan of care, PT/OT, , respiratory therapy, out of 

bed/ambulate, DVT proph w/heparin





* .








Review of Systems





- Medications/Allergies


Allergies/Adverse Reactions: 


 Allergies











Allergy/AdvReac Type Severity Reaction Status Date / Time


 


clonazepam [From Klonopin] Allergy   Verified 03/20/18 07:58


 


codeine Allergy   Verified 03/20/18 07:58


 


Penicillins Allergy   Verified 03/20/18 07:58


 


protamine Allergy   Verified 03/20/18 07:58











Medications: 


 Current Medications





Albuterol/Ipratropium (Duoneb)  3 ml NEB Q4H PRN


   PRN Reason: SOB &/or Wheezing


Albuterol/Ipratropium (Duoneb)  3 ml NEB [id number] [facility]


   Last Admin: 04/03/18 12:15 Dose:  3 ml


Aspirin (Aspirin)  325 mg PO DAILY [facility]


Dextrose/Water (Dextrose 50%)  25 gm SLOW IVP PRN PRN


   PRN Reason: Hypoglycemia


Glucagon (Glucagon)  1 mg IM PRN PRN


   PRN Reason: Hypoglycemia


Dextrose/Water (D5w)  1,000 mls @ 0 mls/hr IV .Q0M PRN; As Directed


   PRN Reason: Hypoglycemia


Doxycycline Hyclate 100 mg/ (Sodium Chloride)  100 mls @ 100 mls/hr IVPB 1100,

2300 [facility]


   Last Admin: 04/03/18 11:51 Dose:  100 mls


Insulin Human Lispro (Humalog)  0 units SC .MILD SLIDING SCALE PRN


   PRN Reason: Mild Correctional Scale


   Last Admin: 04/03/18 11:48 Dose:  4 unit


Levothyroxine Sodium (Synthroid)  75 mcg PO 0600 [facility]


   Last Admin: 04/03/18 05:29 Dose:  75 mcg


Methylprednisolone Sodium Succinate (Solu-Medrol)  40 mg IVP Q6HR [facility]


   Last Admin: 04/03/18 11:51 Dose:  40 mg


Montelukast Sodium (Singulair)  10 mg PO HS [facility]


Non-Formulary Medication (Amlodipine Besylate [Amlodipine Besylate])  2.5 mg PO 

DAILY [facility]


Non-Formulary Medication (Bupropion Hcl [Bupropion Hcl Er])  200 mg PO BID [facility]


Non-Formulary Medication (Ferrous Sulfate [Ferrous Sulfate])  325 mg PO DAILY 

[facility]


Non-Formulary Medication (Lovastatin [Lovastatin])  20 mg PO QPM-WM [facility]


Non-Formulary Medication (Omeprazole [Omeprazole])  40 mg PO DAILY [facility]


Non-Formulary Medication (Trazodone Hcl [Trazodone Hcl])  100 mg PO HS [facility]


Ondansetron HCl (Zofran Odt)  4 mg PO Q6H PRN


   PRN Reason: Nausea/Vomiting


Primidone (Mysoline)  50 mg PO BID [facility]


Ropinirole HCl (Requip)  1 mg PO HS [facility]


Sodium Chloride (Flush - Normal Saline)  10 ml IVF Q12HR [facility]


Sodium Chloride (Flush - Normal Saline)  10 ml IVF PRN PRN


   PRN Reason: Saline Flush

## 2018-04-04 LAB
ALBUMIN SERPL BCG-MCNC: 2.8 G/DL (ref 3.4–4.8)
ALP SERPL-CCNC: 95 U/L (ref 40–150)
ALT SERPL W P-5'-P-CCNC: 54 U/L (ref 8–55)
ANALYZER IN CARDIO: (no result)
ANION GAP SERPL CALC-SCNC: 8 MMOL/L (ref 10–20)
AST SERPL-CCNC: 27 U/L (ref 5–34)
BASE EXCESS STD BLDA CALC-SCNC: 9 MEQ/L
BASOPHILS # BLD AUTO: 0 THOU/UL (ref 0–0.2)
BASOPHILS NFR BLD AUTO: 0.1 % (ref 0–1)
BILIRUB DIRECT SERPL-MCNC: 0.2 MG/DL (ref 0.1–0.3)
BILIRUB SERPL-MCNC: 0.5 MG/DL (ref 0.2–1.2)
BUN SERPL-MCNC: 14 MG/DL (ref 8.4–25.7)
CA-I BLDA-SCNC: 1.2 MMOL/L (ref 1.12–1.3)
CALCIUM SERPL-MCNC: 8.8 MG/DL (ref 7.8–10.44)
CHLORIDE SERPL-SCNC: 99 MMOL/L (ref 98–107)
CO2 SERPL-SCNC: 37 MMOL/L (ref 23–31)
CREAT CL PREDICTED SERPL C-G-VRATE: 104 ML/MIN (ref 70–130)
EOSINOPHIL # BLD AUTO: 0 THOU/UL (ref 0–0.7)
EOSINOPHIL NFR BLD AUTO: 0 % (ref 0–10)
GLUCOSE SERPL-MCNC: 178 MG/DL (ref 80–115)
HCO3 BLDA-SCNC: 33.2 MEQ/L (ref 22–26)
HCT VFR BLDA CALC: 29.9 % (ref 42–52)
HGB BLD-MCNC: 7.9 G/DL (ref 14–18)
HGB BLDA-MCNC: 8.5 G/DL (ref 14–18)
LYMPHOCYTES # BLD: 0.5 THOU/UL (ref 1.2–3.4)
LYMPHOCYTES NFR BLD AUTO: 4.6 % (ref 21–51)
MCH RBC QN AUTO: 27.5 PG (ref 27–31)
MCV RBC AUTO: 88.9 FL (ref 80–94)
MONOCYTES # BLD AUTO: 0.5 THOU/UL (ref 0.11–0.59)
MONOCYTES NFR BLD AUTO: 4.7 % (ref 0–10)
NEUTROPHILS # BLD AUTO: 9.2 THOU/UL (ref 1.4–6.5)
NEUTROPHILS NFR BLD AUTO: 90.6 % (ref 42–75)
PCO2 BLDA: 44.4 MMHG (ref 35–45)
PH BLDA: 7.49 [PH] (ref 7.35–7.45)
PLATELET # BLD AUTO: 200 THOU/UL (ref 130–400)
PO2 BLDA: 55.8 MMHG (ref 80–100)
POTASSIUM SERPL-SCNC: 4.6 MMOL/L (ref 3.5–5.1)
RBC # BLD AUTO: 2.87 MILL/UL (ref 4.7–6.1)
SODIUM SERPL-SCNC: 139 MMOL/L (ref 136–145)
SPECIMEN DRAWN FROM PATIENT: (no result)
WBC # BLD AUTO: 10.1 THOU/UL (ref 4.8–10.8)

## 2018-04-04 RX ADMIN — BUPROPION HYDROCHLORIDE SCH MG: 100 TABLET, EXTENDED RELEASE ORAL at 22:43

## 2018-04-04 RX ADMIN — BUPROPION HYDROCHLORIDE SCH MG: 100 TABLET, EXTENDED RELEASE ORAL at 09:12

## 2018-04-04 RX ADMIN — HEPARIN SODIUM SCH UNITS: 5000 INJECTION, SOLUTION INTRAVENOUS; SUBCUTANEOUS at 09:13

## 2018-04-04 RX ADMIN — INSULIN LISPRO PRN UNIT: 100 INJECTION, SOLUTION INTRAVENOUS; SUBCUTANEOUS at 11:39

## 2018-04-04 RX ADMIN — Medication SCH ML: at 09:15

## 2018-04-04 RX ADMIN — Medication SCH ML: at 21:28

## 2018-04-04 RX ADMIN — HEPARIN SODIUM SCH UNITS: 5000 INJECTION, SOLUTION INTRAVENOUS; SUBCUTANEOUS at 21:28

## 2018-04-04 RX ADMIN — HEPARIN SODIUM SCH UNITS: 5000 INJECTION, SOLUTION INTRAVENOUS; SUBCUTANEOUS at 14:43

## 2018-04-04 NOTE — PRG
DATE OF SERVICE:  04/04/2018

 

SERVICE:  Pulmonary Medicine.

 

INTERVAL HISTORY:  The patient is doing fine from a respiratory standpoint.  He is breathing comforta
torres today.  He is not generating much in the way of sputum.  Denies any chest pain, nausea, vomiting 
or shortness of breath.

 

PHYSICAL EXAMINATION:

VITAL SIGNS:  Afebrile, pulse 78, blood pressure 128/70, respirations 20, saturation 95% on 3.5 liter
s nasal cannula.

GENERAL:  The patient is awake, alert, no apparent distress.

LUNGS:  Slightly improved air entry.  There is prolonged expiratory phase.  Rhonchi are present.  No 
crackles or wheezing appreciated.

HEART:  Normal rate and regular.

ABDOMEN:  Soft, nontender, nondistended.  Bowel sounds are positive.

MUSCULOSKELETAL:  No cyanosis or clubbing.  No pitting in the bilateral lower extremities.

NEUROLOGIC:  Grossly nonfocal.

 

LABORATORY DATA:  Hemoglobin 7.9 and dropping, platelets 200,000.  Bicarbonate 37.  Basic metabolic p
rofile is otherwise unremarkable.  Liver function studies are unremarkable.  Blood sugar ranges from 
183-256.  Barbiturates, benzos, and cannabinoids are all present in the urine drug screen.  Blood cul
tures x2 remain unremarkable.

 

ASSESSMENT:

1.  Acute hypoxic respiratory failure.

2.  Chronic obstructive pulmonary disease with acute exacerbation.

3.  Pulmonary infiltrate, characterized by centrilobular ground glass opacifications, and tree-in-bud
 changes.

4.  Tobacco abuse, ongoing.

5.  Deconditioning, severe.

 

DISCUSSION AND PLAN:  We will continue our nebulized medications, antibiotics and steroids.  We will 
get an AFB for smear and culture.  Tuberculosis is within my differential, but quite low on it.  That
 being said, we will put him on respiratory isolation until we can improve sputum is negative.  Once 
this sample was collected, from my perspective, if he is stable for discharge, he can be sent out on 
a 5-day course of steroid, 7-day course of antibiotic.  We will have him return to clinic in the outp
atient setting.

## 2018-04-04 NOTE — PDOC.PN
- Subjective


Encounter Start Date: 04/04/18


Encounter Start Time: 11:43


Subjective: No new complaints


-: No acute events overnight. 





- Objective


MAR Reviewed: Yes


Vital Signs & Weight: 


 Vital Signs (12 hours)











  Temp Pulse Resp BP Pulse Ox Pulse Ox Pulse Ox


 


 04/04/18 09:10   78     


 


 04/04/18 08:00  98.5 F  78  20   95  


 


 04/04/18 07:55  98.5 F  78  20  128/70  95  


 


 04/04/18 07:30       90 L  84 L


 


 04/04/18 06:49      95  


 


 04/04/18 06:48   75  16   95  


 


 04/04/18 00:00  97.8 F  68  18  134/65  93 L  














  Pulse Ox


 


 04/04/18 09:10 


 


 04/04/18 08:00 


 


 04/04/18 07:55 


 


 04/04/18 07:30  89 L


 


 04/04/18 06:49 


 


 04/04/18 06:48 


 


 04/04/18 00:00 








 Weight











Weight                         142 lb














I&O: 


 











 04/03/18 04/04/18 04/05/18





 06:59 06:59 06:59


 


Intake Total 940 240 


 


Output Total 750  


 


Balance 190 240 











Result Diagrams: 


 04/04/18 03:35





 04/04/18 03:35


Additional Labs: 


 Accuchecks











  04/04/18 04/03/18 04/03/18





  06:27 20:47 16:45


 


POC Glucose  183 H  207 H  207 H














Phys Exam





- Physical Examination


Constitutional: NAD


HEENT: PERRLA, moist MMs, sclera anicteric, oral pharynx no lesions


Neck: no JVD, supple, full ROM


Respiratory: no rales, no rhonchi, wheezing present


Cardiovascular: RRR (s1 s2 only), no significant murmur, no rub


Gastrointestinal: soft, non-tender, no distention, positive bowel sounds


Musculoskeletal: no edema, pulses present


Neurological: non-focal, moves all 4 limbs


Psychiatric: normal affect, A&O x 3


Skin: no rash, normal turgor





Dx/Plan


(1) Acute respiratory failure


Code(s): J96.00 - ACUTE RESPIRATORY FAILURE, UNSP W HYPOXIA OR HYPERCAPNIA   

Status: Acute   


Qualifiers: 


   Respiratory failure complication: hypercapnia   Qualified Code(s): J96.02 - 

Acute respiratory failure with hypercapnia   


Comment: Stable. Likely 2/2 COPD exacerbation and PNA. Continue nebs and 

steroids- will be tapered off. Had infiltrates on CT chest so TB being ruled 

out. WIll get home O2 eval as well.    





(2) COPD exacerbation


Code(s): J44.1 - CHRONIC OBSTRUCTIVE PULMONARY DISEASE W (ACUTE) EXACERBATION   

Status: Acute   


Plan: 


As above








(3) Afib


Code(s): I48.91 - UNSPECIFIED ATRIAL FIBRILLATION   Status: Chronic   


Qualifiers: 


   Atrial fibrillation type: chronic   Qualified Code(s): I48.2 - Chronic 

atrial fibrillation   


Comment: Rate controlled. Continue home regimen and monitor heart rate closely. 

Hold amiodarone for now. 


   





(4) CAD (coronary artery disease)


Code(s): I25.10 - ATHSCL HEART DISEASE OF NATIVE CORONARY ARTERY W/O ANG PCTRS 

  Status: Chronic   


Qualifiers: 


   Coronary Disease-Associated Artery/Lesion type: bypass graft   Native vs. 

transplanted heart: native heart   Associated angina: without angina   

Qualified Code(s): I25.810 - Atherosclerosis of coronary artery bypass graft(s) 

without angina pectoris   


Comment: Stable. Chest pain free. Continued on home medications.    





(5) Dyslipidemia


Code(s): E78.5 - HYPERLIPIDEMIA, UNSPECIFIED   Status: Chronic   Comment: On 

Statins   





(6) HTN (hypertension)


Code(s): I10 - ESSENTIAL (PRIMARY) HYPERTENSION   Status: Chronic   


Qualifiers: 


   Hypertension type: essential hypertension   Qualified Code(s): I10 - 

Essential (primary) hypertension   


Comment: Controlled and at goal. Continue current medications    





(7) Moderate protein malnutrition


Code(s): E44.0 - MODERATE PROTEIN-CALORIE MALNUTRITION   Status: Chronic   





(8) Alcohol abuse


Code(s): F10.10 - ALCOHOL ABUSE, UNCOMPLICATED   Status: Chronic   Comment: 

Counseled.    





(9) Physical deconditioning


Code(s): R53.81 - OTHER MALAISE   Status: Acute   Comment: PT/OT on board.    





- Plan


cont current plan of care, plan discussed w/ family, respiratory therapy, 

incentive spirometry, DVT proph w/heparin





* .








Review of Systems





- Medications/Allergies


Allergies/Adverse Reactions: 


 Allergies











Allergy/AdvReac Type Severity Reaction Status Date / Time


 


clonazepam [From Klonopin] Allergy   Verified 03/20/18 07:58


 


codeine Allergy   Verified 03/20/18 07:58


 


Penicillins Allergy   Verified 03/20/18 07:58


 


protamine Allergy   Verified 03/20/18 07:58











Medications: 


 Current Medications





Albuterol Sulfate (Ventolin)  2.5 mg NEB [id number] PRN


   PRN Reason: Wheezing


Albuterol/Ipratropium (Duoneb)  3 ml NEB Q4H PRN


   PRN Reason: SOB &/or Wheezing


Albuterol/Ipratropium (Duoneb)  3 ml NEB [id number] [facility]


   Last Admin: 04/04/18 06:48 Dose:  3 ml


Amlodipine Besylate (Norvasc)  2.5 mg PO DAILY [facility]


   Last Admin: 04/04/18 09:10 Dose:  2.5 mg


Aspirin (Aspirin)  325 mg PO DAILY [facility]


   Last Admin: 04/04/18 09:11 Dose:  325 mg


Bupropion HCl (Wellbutrin Sr)  200 mg PO BID [facility]


   Last Admin: 04/04/18 09:12 Dose:  200 mg


Dextrose/Water (Dextrose 50%)  25 gm SLOW IVP PRN PRN


   PRN Reason: Hypoglycemia


Ferrous Sulfate (Feosol)  325 mg PO DAILY [facility]


   Last Admin: 04/04/18 09:12 Dose:  325 mg


Glucagon (Glucagon)  1 mg IM PRN PRN


   PRN Reason: Hypoglycemia


Guaifenesin (Mucinex)  600 mg PO Q12HR [facility]


   Last Admin: 04/04/18 09:13 Dose:  600 mg


Heparin Sodium (Porcine) (Heparin)  5,000 units SC TID [facility]


   Last Admin: 04/04/18 09:13 Dose:  5,000 units


Dextrose/Water (D5w)  1,000 mls @ 0 mls/hr IV .Q0M PRN; As Directed


   PRN Reason: Hypoglycemia


Doxycycline Hyclate 100 mg/ (Sodium Chloride)  100 mls @ 100 mls/hr IVPB 1100,

2300 [facility]


   Last Admin: 04/04/18 11:23 Dose:  100 mls


Insulin Human Lispro (Humalog)  0 units SC .MILD SLIDING SCALE PRN


   PRN Reason: Mild Correctional Scale


   Last Admin: 04/03/18 18:34 Dose:  3 unit


Levothyroxine Sodium (Synthroid)  75 mcg PO 0600 [facility]


   Last Admin: 04/04/18 06:19 Dose:  75 mcg


Methylprednisolone Sodium Succinate (Solu-Medrol)  40 mg IVP Q12HR [facility]


Metoprolol Tartrate (Lopressor)  25 mg PO BID [facility]


   Last Admin: 04/04/18 09:14 Dose:  25 mg


Montelukast Sodium (Singulair)  10 mg PO HS [facility]


   Last Admin: 04/03/18 19:56 Dose:  10 mg


Nitroglycerin (Nitrostat)  0.4 mg SL Q5MIN PRN


   PRN Reason: Chest Pain


   Last Admin: 04/03/18 17:18 Dose:  0.4 mg


Ondansetron HCl (Zofran Odt)  4 mg PO Q6H PRN


   PRN Reason: Nausea/Vomiting


Pantoprazole Sodium (Protonix)  40 mg PO DAILY [facility]


   Last Admin: 04/04/18 09:14 Dose:  40 mg


Primidone (Mysoline)  50 mg PO BID [facility]


   Last Admin: 04/04/18 09:14 Dose:  50 mg


Ropinirole HCl (Requip)  1 mg PO [facility]


   Last Admin: 04/03/18 19:56 Dose:  1 mg


Simvastatin (Zocor)  10 mg PO [facility]


   Last Admin: 04/03/18 19:56 Dose:  10 mg


Sodium Chloride (Flush - Normal Saline)  10 ml IVF Q12HR [facility]


   Last Admin: 04/04/18 09:15 Dose:  10 ml


Sodium Chloride (Flush - Normal Saline)  10 ml IVF PRN PRN


   PRN Reason: Saline Flush


Tramadol HCl (Ultram)  50 mg PO Q6H PRN


   PRN Reason: Moderate Pain (4-6)


   Last Admin: 04/04/18 02:28 Dose:  50 mg


Trazodone HCl (Desyrel)  100 mg PO [facility]


   Last Admin: 04/03/18 21:59 Dose:  100 mg

## 2018-04-04 NOTE — HP
CHIEF COMPLAINT:  Shortness of breath.

 

HISTORY OF PRESENT ILLNESS:  This is a 63-year-old male who was recently discharged from our facility
 status post atrial flutter ablation procedure who presented with progressive shortness of breath young
t has worsened over the last 24-48 hours.  He complains of shortness of breath initially only with ex
ertion, then now has progressed at rest and with exertion.  Denies any fevers, chills, endorses a cou
gh that is grossly nonproductive.

 

He is not clear if he has had similar episodes before.  The patient today is accompanied by his child
thais who are with him at bedside.  The patient himself is a marginal historian and tends to feel that 
he is doing "alright" while his family members are a bit more concerned about his overall condition.

 

REVIEW OF SYSTEMS:  As per HPI.  Constitutional:  No documented fevers, but the patient does endorse 
having had subjective fevers and intermittent subjective chills, no quantified weight loss or gain si
nce his last discharge.  HEENT:  Denies any headaches, vision changes.  Endorses intermittent lighthe
adedness, dizziness with exertion.  Cardiovascular:  Denies any chest pain or chest pressure, left-si
ded arm numbness or tingling.  No episodes of diaphoresis, shortness of breath as above.  Respiratory
:  Please see discussion above.  The patient is not clear if he has had any overt known sick contacts
 since discharge from the hospital.  Gastrointestinal:  The patient endorses a decreased appetite, bu
t denies any overt nausea or emesis.  The patient states he last had a bowel movement yesterday and h
e denies any issues with diarrhea or constipation.  Genitourinary:  Denies any dysuria or changes in 
urinary quality color or quantity.  Musculoskeletal:  Denies any new myalgias or arthralgias.  

Remainder of the review of systems is otherwise negative.

 

PAST MEDICAL HISTORY:  As per above.

1.  Coronary artery disease status post CABG x3.

2.  Atrial flutter, status post ablation in 03/2018.

3.  Hypertension.

4.  Hyperlipidemia.

5.  Chronic tobacco.

6.  Binge drinking.

 

HOME MEDICATIONS:  The patient has listed the following list, ondansetron (Zofran) 4 mg p.o. q.6 hour
s p.r.n., prednisone 10 mg p.o. q.a.m., albuterol 3 mL nebs t.i.d., amiodarone 400 mg p.o. b.i.d., Ni
trostat 0.4 mg sublingual every 5 minutes p.r.n., ferrous sulfate 325 mg p.o. daily, amlodipine besyl
ate 2.5 mg p.o. daily, trazodone 100 mg p.o. at bedtime, bupropion 200 mg p.o. b.i.d., albuterol sulf
ate 2 puffs inhalation q.6 hour p.r.n., montelukast sodium 10 mg p.o. at bedtime, levothyroxine 75 mc
g p.o. daily, _____ inhalation one inhalation b.i.d., ropinirole 1 mg p.o. at bedtime, primidone 50 m
g p.o. b.i.d., multivitamin 1 tab daily, aspirin 325 mg p.o. daily, omeprazole 40 mg p.o. daily, lova
statin 20 mg p.o. q.p.m., metoprolol tartrate 25 mg p.o. b.i.d., budesonide/formoterol 2 puffs inhala
tion b.i.d., albuterol sulfate 2 puffs inhalation q.4 hours p.r.n.

 

ALLERGIES:  CLONAZEPAM, CODEINE, PENICILLINS, and Protamine without known reaction to any of those.

 

FAMILY HISTORY:  Significant for diabetes, blood pressure, and stroke in family members.

 

SOCIAL HISTORY:  The patient has been a 2 pack a day smoker for the last 50 years.  Denies any illici
t drug use.  Endorses intermittent alcohol use, but has not had any alcohol use since his last discha
rge from our facility.  The patient is here today with his two children he designates as his medical 
decision makers if he is unable to make his own medical decision.  He wishes to be FULL CODE at this 
point in time.

 

PHYSICAL EXAMINATION:

GENERAL:  Patient is awake, alert, pleasant, conversant, but otherwise poor historian.

HEENT:  Slightly dry mucous membranes.  Poor dentition.  Equal ocular motions are intact.  Normocepha
lic, atraumatic.

CARDIOVASCULAR:  S1, S2.  No murmurs, rubs or gallops.  Pulses 2+ bilateral upper extremity.  Trace b
ilateral pitting pedal edema.

RESPIRATORY:  Diminished throughout mostly clear to auscultation without any wheezes, rales or rhonch
i.

ABDOMEN:  Positive bowel sounds, soft, nontender to palpation.

NEUROLOGIC:  Moving all 4 extremities independently and able to self-reposition in the bed without di
fficulty or assistance.

 

LABORATORY DATA AND IMAGING:  WBC 9.1, hemoglobin 9.1, hematocrit 29.3, platelets 211.  PT 14.3, INR 
1.1.  ABG with a pH of 7.39, pCO2 of 60.4, pO2 of 51.5.  Sodium 137, potassium 4.5, chloride 97, bica
rbonate 34, BUN 15, creatinine 0.74.  Troponin less than 0.01 x2.  B-natriuretic peptide 508.3, total
 protein 5.9, albumin 3.2.

 

On 04/01/2018, chest x-ray mild interstitial edema on 04/01/2018 chest and thorax CTA.  Impression:  
"No central segmental pulmonary embolus.  Ground glass nodular opacities and predominantly in the bro
nchial distribution suspicious for bronchiolitis of either infectious or inflammatory etiology.  Ther
e is a mildly prominent pretracheal mediastinal lymph node measuring 1 cm, small bilateral pleural ef
fusions, density within the gallbladder may reflect gallbladder sludge.  No visible stones were seen 
on the comparison CT examination dated 03/20/2018.

 

ASSESSMENT AND PLAN:  A 63-year-old male who presents with chief complaint of shortness of breath.

 

1.  Shortness of breath.  The patient's cardiac status currently appears to be stable.  Alternative c
ould be pulmonary  of his shortness of breath.  He certainly has likely multifactorial componen
t to his current presentation.  Initiate empiric steroids, antibiotics, nebulizer treatments along wi
th supportive oxygenation as needed.  Discussed above with the patient's family and the patient at [facility].  I appreciate pulmonary consultation.

2.  Recent history of atrial flutter, status post ablation, currently is in normal sinus rhythm.  Con
tinue to closely monitor.  We will modify the antibiotic selection secondary to concern for QT prolon
gation with the use of quinolones.  We will opt for doxycycline and closely monitor for any further s
igns or symptoms of developing pneumonia.

3.  History of tobacco and alcohol use.  Check urine drug screen and also check an alcohol screen and
 closely monitor for any signs or symptoms of withdrawal.

4.  DIET:  Cardiac.

5.  ACTIVITY:  Out of bed as tolerated with physical therapy.

6.  Deep venous thrombosis prophylaxis, heparin.

 

Thank you for asking me to care for your patient.  Any questions, concerns, contact me at [facility].  Admit the patient inpatient medical/surgical.  FULL CODE.

## 2018-04-05 LAB
ANION GAP SERPL CALC-SCNC: 6 MMOL/L (ref 10–20)
BASOPHILS # BLD AUTO: 0 THOU/UL (ref 0–0.2)
BASOPHILS NFR BLD AUTO: 0 % (ref 0–1)
BUN SERPL-MCNC: 14 MG/DL (ref 8.4–25.7)
CALCIUM SERPL-MCNC: 9 MG/DL (ref 7.8–10.44)
CHLORIDE SERPL-SCNC: 99 MMOL/L (ref 98–107)
CO2 SERPL-SCNC: 36 MMOL/L (ref 23–31)
CREAT CL PREDICTED SERPL C-G-VRATE: 104 ML/MIN (ref 70–130)
EOSINOPHIL # BLD AUTO: 0 THOU/UL (ref 0–0.7)
EOSINOPHIL NFR BLD AUTO: 0 % (ref 0–10)
GLUCOSE SERPL-MCNC: 194 MG/DL (ref 80–115)
HGB BLD-MCNC: 8.8 G/DL (ref 14–18)
LYMPHOCYTES # BLD: 0.5 THOU/UL (ref 1.2–3.4)
LYMPHOCYTES NFR BLD AUTO: 4.5 % (ref 21–51)
MCH RBC QN AUTO: 26.9 PG (ref 27–31)
MCV RBC AUTO: 87.6 FL (ref 80–94)
MONOCYTES # BLD AUTO: 0.6 THOU/UL (ref 0.11–0.59)
MONOCYTES NFR BLD AUTO: 4.9 % (ref 0–10)
NEUTROPHILS # BLD AUTO: 10.2 THOU/UL (ref 1.4–6.5)
NEUTROPHILS NFR BLD AUTO: 90.6 % (ref 42–75)
PLATELET # BLD AUTO: 199 THOU/UL (ref 130–400)
POTASSIUM SERPL-SCNC: 4.3 MMOL/L (ref 3.5–5.1)
RBC # BLD AUTO: 3.27 MILL/UL (ref 4.7–6.1)
SODIUM SERPL-SCNC: 137 MMOL/L (ref 136–145)
WBC # BLD AUTO: 11.3 THOU/UL (ref 4.8–10.8)

## 2018-04-05 RX ADMIN — HEPARIN SODIUM SCH UNITS: 5000 INJECTION, SOLUTION INTRAVENOUS; SUBCUTANEOUS at 09:32

## 2018-04-05 RX ADMIN — HEPARIN SODIUM SCH UNITS: 5000 INJECTION, SOLUTION INTRAVENOUS; SUBCUTANEOUS at 15:03

## 2018-04-05 RX ADMIN — INSULIN LISPRO PRN UNIT: 100 INJECTION, SOLUTION INTRAVENOUS; SUBCUTANEOUS at 12:18

## 2018-04-05 RX ADMIN — BUPROPION HYDROCHLORIDE SCH MG: 100 TABLET, EXTENDED RELEASE ORAL at 21:10

## 2018-04-05 RX ADMIN — Medication SCH ML: at 21:16

## 2018-04-05 RX ADMIN — HEPARIN SODIUM SCH UNITS: 5000 INJECTION, SOLUTION INTRAVENOUS; SUBCUTANEOUS at 21:15

## 2018-04-05 RX ADMIN — BUPROPION HYDROCHLORIDE SCH MG: 100 TABLET, EXTENDED RELEASE ORAL at 09:31

## 2018-04-05 RX ADMIN — Medication SCH ML: at 09:32

## 2018-04-05 RX ADMIN — INSULIN LISPRO PRN UNIT: 100 INJECTION, SOLUTION INTRAVENOUS; SUBCUTANEOUS at 18:19

## 2018-04-05 NOTE — PDOC.PN
- Subjective


Encounter Start Date: 04/05/18


Encounter Start Time: 15:33


Subjective: No complaints.


-: No acute events overnight. 





- Objective


MAR Reviewed: Yes


Vital Signs & Weight: 


 Vital Signs (12 hours)











  Temp Pulse Resp BP BP Pulse Ox Pulse Ox


 


 04/05/18 12:25  98.1 F  71  20   132/73  99 


 


 04/05/18 10:03        92 L


 


 04/05/18 09:31   79   133/75   


 


 04/05/18 08:00  97.3 F L  98  20   133/75  93 L 


 


 04/05/18 07:16   100  16    














  Pulse Ox


 


 04/05/18 12:25 


 


 04/05/18 10:03  89 L


 


 04/05/18 09:31 


 


 04/05/18 08:00 


 


 04/05/18 07:16 








 Weight











Weight                         142 lb














I&O: 


 











 04/04/18 04/05/18 04/06/18





 06:59 06:59 06:59


 


Intake Total 240 1125 


 


Balance 240 1125 











Result Diagrams: 


 04/05/18 04:45





 04/05/18 04:45


Additional Labs: 


 Accuchecks











  04/05/18 04/05/18 04/04/18





  11:31 04:58 20:38


 


POC Glucose  213 H  198 H  189 H














  04/04/18





  16:38


 


POC Glucose  201 H














Phys Exam





- Physical Examination


Constitutional: NAD


HEENT: PERRLA, moist MMs, sclera anicteric


Neck: no JVD, supple, full ROM


Respiratory: no rales, no rhonchi, wheezing present (mild)


Cardiovascular: RRR, no significant murmur, no rub


Gastrointestinal: soft, non-tender, no distention, positive bowel sounds


Musculoskeletal: no edema, pulses present


Neurological: non-focal, moves all 4 limbs


Psychiatric: normal affect, A&O x 3


Skin: no rash, normal turgor





Dx/Plan


(1) Acute respiratory failure


Code(s): J96.00 - ACUTE RESPIRATORY FAILURE, UNSP W HYPOXIA OR HYPERCAPNIA   

Status: Acute   


Qualifiers: 


   Respiratory failure complication: hypercapnia   Qualified Code(s): J96.02 - 

Acute respiratory failure with hypercapnia   


Comment: Stable. 2/2 COPD exacerbation and PNA. Continue nebs and steroids- 

will be tapered off. Had infiltrates on CT chest so TB being ruled out. Will f/

u sputum AFB.   





(2) COPD exacerbation


Code(s): J44.1 - CHRONIC OBSTRUCTIVE PULMONARY DISEASE W (ACUTE) EXACERBATION   

Status: Acute   





(3) Afib


Code(s): I48.91 - UNSPECIFIED ATRIAL FIBRILLATION   Status: Chronic   


Qualifiers: 


   Atrial fibrillation type: chronic   Qualified Code(s): I48.2 - Chronic 

atrial fibrillation   


Comment: Rate controlled. Continue home regimen.


   





(4) CAD (coronary artery disease)


Code(s): I25.10 - ATHSCL HEART DISEASE OF NATIVE CORONARY ARTERY W/O ANG PCTRS 

  Status: Chronic   


Qualifiers: 


   Coronary Disease-Associated Artery/Lesion type: bypass graft   Native vs. 

transplanted heart: native heart   Associated angina: without angina   

Qualified Code(s): I25.810 - Atherosclerosis of coronary artery bypass graft(s) 

without angina pectoris   


Comment: Stable. Chest pain free. Continued on home medications.    





(5) Dyslipidemia


Code(s): E78.5 - HYPERLIPIDEMIA, UNSPECIFIED   Status: Chronic   Comment: On 

Statins   





(6) HTN (hypertension)


Code(s): I10 - ESSENTIAL (PRIMARY) HYPERTENSION   Status: Chronic   


Qualifiers: 


   Hypertension type: essential hypertension   Qualified Code(s): I10 - 

Essential (primary) hypertension   


Comment: Controlled and at goal. Continue current medications    





(7) Moderate protein malnutrition


Code(s): E44.0 - MODERATE PROTEIN-CALORIE MALNUTRITION   Status: Chronic   

Comment: Improving with nutritional supplements. 


   





(8) Alcohol abuse


Code(s): F10.10 - ALCOHOL ABUSE, UNCOMPLICATED   Status: Chronic   Comment: 

Counseled.    





(9) Physical deconditioning


Code(s): R53.81 - OTHER MALAISE   Status: Acute   Comment: PT/OT on board.    





- Plan


cont current plan of care, continue antibiotics, , respiratory 

therapy


Will need home O2 as he continues to significantly desaturate on RA


-: f/u AFB. If negative, then can be discharged home. 





* .








Review of Systems





- Medications/Allergies


Allergies/Adverse Reactions: 


 Allergies











Allergy/AdvReac Type Severity Reaction Status Date / Time


 


clonazepam [From Klonopin] Allergy   Verified 03/20/18 07:58


 


codeine Allergy   Verified 03/20/18 07:58


 


Penicillins Allergy   Verified 03/20/18 07:58


 


protamine Allergy   Verified 03/20/18 07:58











Medications: 


 Current Medications





Albuterol Sulfate (Ventolin)  2.5 mg NEB [id number] PRN


   PRN Reason: Wheezing


Albuterol/Ipratropium (Duoneb)  3 ml NEB Q4H PRN


   PRN Reason: SOB &/or Wheezing


Albuterol/Ipratropium (Duoneb)  3 ml NEB [id number] [facility]


   Last Admin: 04/05/18 13:58 Dose:  3 ml


Amlodipine Besylate (Norvasc)  2.5 mg PO DAILY [facility]


   Last Admin: 04/05/18 09:31 Dose:  2.5 mg


Aspirin (Aspirin)  325 mg PO DAILY [facility]


   Last Admin: 04/05/18 09:31 Dose:  325 mg


Bupropion HCl (Wellbutrin Sr)  200 mg PO BID [facility]


   Last Admin: 04/05/18 09:31 Dose:  200 mg


Dextrose/Water (Dextrose 50%)  25 gm SLOW IVP PRN PRN


   PRN Reason: Hypoglycemia


Doxycycline Hyclate (Vibramycin)  100 mg PO 0800,2100 [facility]


   Stop: 04/08/18 08:01


Ferrous Sulfate (Feosol)  325 mg PO DAILY [facility]


   Last Admin: 04/05/18 09:31 Dose:  325 mg


Glucagon (Glucagon)  1 mg IM PRN PRN


   PRN Reason: Hypoglycemia


Guaifenesin (Mucinex)  600 mg PO Q12HR [facility]


   Last Admin: 04/05/18 09:31 Dose:  600 mg


Heparin Sodium (Porcine) (Heparin)  5,000 units SC TID [facility]


   Last Admin: 04/05/18 15:03 Dose:  5,000 units


Dextrose/Water (D5w)  1,000 mls @ 0 mls/hr IV .Q0M PRN; As Directed


   PRN Reason: Hypoglycemia


Insulin Human Lispro (Humalog)  0 units SC .MILD SLIDING SCALE PRN


   PRN Reason: Mild Correctional Scale


   Last Admin: 04/05/18 12:18 Dose:  3 unit


Levothyroxine Sodium (Synthroid)  75 mcg PO 0600 [facility]


   Last Admin: 04/05/18 04:59 Dose:  75 mcg


Metoprolol Tartrate (Lopressor)  25 mg PO BID [facility]


   Last Admin: 04/05/18 09:31 Dose:  25 mg


Montelukast Sodium (Singulair)  10 mg PO HS [facility]


   Last Admin: 04/04/18 21:27 Dose:  10 mg


Nitroglycerin (Nitrostat)  0.4 mg SL Q5MIN PRN


   PRN Reason: Chest Pain


   Last Admin: 04/03/18 17:18 Dose:  0.4 mg


Ondansetron HCl (Zofran Odt)  4 mg PO Q6H PRN


   PRN Reason: Nausea/Vomiting


Pantoprazole Sodium (Protonix)  40 mg PO DAILY [facility]


   Last Admin: 04/05/18 09:31 Dose:  40 mg


Prednisone (Prednisone)  40 mg PO QAM-WM [facility]


   Stop: 04/07/18 08:01


Primidone (Mysoline)  50 mg PO BID [facility]


   Last Admin: 04/05/18 09:31 Dose:  50 mg


Ropinirole HCl (Requip)  1 mg PO HS [facility]


   Last Admin: 04/04/18 21:27 Dose:  1 mg


Simvastatin (Zocor)  10 mg PO HS [facility]


   Last Admin: 04/04/18 21:27 Dose:  10 mg


Sodium Chloride (Flush - Normal Saline)  10 ml IVF Q12HR [facility]


   Last Admin: 04/05/18 09:32 Dose:  10 ml


Sodium Chloride (Flush - Normal Saline)  10 ml IVF PRN PRN


   PRN Reason: Saline Flush


Tramadol HCl (Ultram)  50 mg PO Q6H PRN


   PRN Reason: Moderate Pain (4-6)


   Last Admin: 04/05/18 12:17 Dose:  50 mg


Trazodone HCl (Desyrel)  100 mg PO HS [facility]


   Last Admin: 04/04/18 21:27 Dose:  100 mg

## 2018-04-05 NOTE — PRG
DATE OF SERVICE:  04/05/2018

 

SERVICE:  Pulmonary Medicine

 

INTERVAL HISTORY:  The patient is doing fine from a cardiovascular and respiratory standpoint.  He de
nies any current chest pain, nausea, vomiting or shortness of breath.  His breathing is actually back
 to baseline.  Otherwise, he has no specific complaints.  He is going to be set up for home oxygen.

 

PHYSICAL EXAMINATION:

VITAL SIGNS:  Afebrile, pulse 71, blood pressure 132/73, respirations 20, saturation 99% on 4 liters 
nasal cannula.

GENERAL:  The patient is awake, alert, no apparent distress.

LUNGS:  Decreased air entry.  There is prolonged expiratory phase, but the rhonchi and crackles are m
uch improved.

HEART:  Normal rate, regular.

ABDOMEN:  Soft, nontender, nondistended.  Bowel sounds are positive.

MUSCULOSKELETAL:  No cyanosis or clubbing.  There is no pitting in the bilateral lower extremities.

NEUROLOGIC:  Grossly nonfocal.

 

LABORATORY DATA:  WBC 11.3, hemoglobin 8.8, platelets 199,000.  PH 7.49, pCO2 44, pO2 55.8.  Bicarbon
ate 36.  Basic metabolic profile is otherwise unremarkable and potassium is 4.3.  Blood cultures x2 a
re unremarkable.

 

ASSESSMENT:

1.  Acute hypoxic respiratory failure.

2.  Chronic obstructive pulmonary disease with acute exacerbation.

3.  Pulmonary infiltrate, characterized by centrilobular ground glass opacifications and tree-in-bud 
changes.

4.  Tobacco abuse, ongoing.

5.  Deconditioning, severe.  

 

DISCUSSION AND PLAN:  We are still waiting for the sputum sample to be stained.  Either way, from my 
perspective, he is stable for transition home today or tomorrow.  I will have him follow up with me i
n clinic in 3-4 weeks in the outpatient setting to consider additional diagnostic investigations  Pul
[facility] will continue to follow if he remains in house.  From my perspective; however, rocky lawson is stable for transition home.

## 2018-04-06 VITALS — SYSTOLIC BLOOD PRESSURE: 129 MMHG | TEMPERATURE: 98 F | DIASTOLIC BLOOD PRESSURE: 69 MMHG

## 2018-04-06 LAB
ANION GAP SERPL CALC-SCNC: 8 MMOL/L (ref 10–20)
BUN SERPL-MCNC: 13 MG/DL (ref 8.4–25.7)
CALCIUM SERPL-MCNC: 8.5 MG/DL (ref 7.8–10.44)
CHLORIDE SERPL-SCNC: 99 MMOL/L (ref 98–107)
CO2 SERPL-SCNC: 33 MMOL/L (ref 23–31)
CREAT CL PREDICTED SERPL C-G-VRATE: 116 ML/MIN (ref 70–130)
GLUCOSE SERPL-MCNC: 87 MG/DL (ref 80–115)
POTASSIUM SERPL-SCNC: 3.5 MMOL/L (ref 3.5–5.1)
SODIUM SERPL-SCNC: 136 MMOL/L (ref 136–145)

## 2018-04-06 RX ADMIN — HEPARIN SODIUM SCH UNITS: 5000 INJECTION, SOLUTION INTRAVENOUS; SUBCUTANEOUS at 08:31

## 2018-04-06 RX ADMIN — Medication SCH ML: at 08:31

## 2018-04-06 RX ADMIN — BUPROPION HYDROCHLORIDE SCH MG: 100 TABLET, EXTENDED RELEASE ORAL at 08:29

## 2018-04-07 NOTE — EKG
Test Reason : SOB

Blood Pressure : ***/*** mmHG

Vent. Rate : 078 BPM     Atrial Rate : 078 BPM

   P-R Int : 192 ms          QRS Dur : 100 ms

    QT Int : 420 ms       P-R-T Axes : 083 -19 071 degrees

   QTc Int : 478 ms

 

Normal sinus rhythm

Possible Left atrial enlargement

Nonspecific T wave abnormality

Prolonged QT

Abnormal ECG

 

Confirmed by ADRIENNE ELI ([id number]),  JOHANNA MCCORMICK ([id number]) on 4/7/2018 8:41:23 PM

 

Referred By:             Confirmed By:ADRIENNE ELI

## 2018-04-09 NOTE — DIS
DATE OF ADMISSION:  04/01/2018

 

DATE OF DISCHARGE:  04/06/2018

 

DISCHARGE DIAGNOSES:  Acute respiratory failure with hypercapnia, chronic obstructive pulmonary disea
se exacerbation, chronic atrial fibrillation, coronary artery disease, dyslipidemia, hypertension, mo
derate protein-malnutrition, alcohol abuse and physical deconditioning.

 

HISTORY OF PRESENT ILLNESS/HOSPITAL COURSE:  Mr. Vignesh Benavidez is a 63-year-old male, recently 
discharged from [facility] with status post atrial flutter ablation.  He presented with prog
ressively worsening shortness of breath for 48 hours, initially only with exertion and progressed to 
rest.  He denied fever or chills, but reports grossly unproductive cough.  He was not sure of similar
 episodes in the past.  His labs were largely unremarkable, but he had an ABG done with a pH of 7.39,
 pCO2 of 60 and pO2 of 51.2.  Troponin was less than 0.21 x2 and BNP was 508.3.  His chest x-ray show
s interstitial edema and he had a CTA done, which showed no central segmental pulmonary embolus, but 
showed ground glass nodular opacities predominantly in the bronchial distribution suspicious for bron
chiolitis of either infectious or inflammatory etiology.  There was mild prominent pretracheal medias
tinal lymph node measuring 1 cm small bilateral pleural effusions, density within the gallbladder may
 reflect gallbladder sludge, but no stones were seen.  On examination, he had diminished breath sound
s bilaterally, but no wheezes, rales or rhonchi.  He was admitted for COPD exacerbation, started on d
oxycycline and nebulizer therapy and he eventually developed respiratory failure and was reviewed by 
the pulmonologist.  TB was ruled out by 2 AFB sputums, which were negative.  He was continued on nebu
lizer therapy, IV steroids and eventually transitioned to p.o. prednisone.  He continued to require o
xygen and was discharged on home oxygen.  This was secured before discharge from the hospital.

 

DISCHARGE MEDICATIONS:  Doxycycline 100 mg b.i.d., Mucinex 600 mg every 12 hours, prednisone 40 mg da
hannah, Utibron Neohaler one inhalation twice daily, omeprazole 40 mg daily, lovastatin 20 mg every even
ing with supper, albuterol sulfate nebulizer 3 mL 3 times a day, ropinirole hydrochloride 1 mg oral a
t bedtime, trazodone hydrochloride 100 mg at bedtime, levothyroxine sodium 75 mcg orally daily, amlod
ipine 2.5 mg daily, nitroglycerin 0.4 mg sublingually every 5 minutes as needed for chest pain, primi
done 50 mg twice daily, multivitamins 1 tablet daily, montelukast sodium 10 mg at bedtime, bupropion 
hydrochloride 100 mg twice daily, amiodarone 400 mg twice daily, aspirin 325 mg daily, metoprolol tar
trate 25 mg twice daily, albuterol sulfate 2 puffs inhaled every 6 hours as needed for shortness of b
reath or wheezing, ondansetron 4 mg every 6 hours as needed for nausea and vomiting,  Symbicort 2 puf
fs inhaled twice a day, albuterol sulfate HFA inhaler 2 puffs inhaled every 4 hours as needed for nydia
rtness of breath or wheezing and ferrous sulfate 325 mg daily.

 

PHYSICAL EXAMINATION:  He was examined on the day of discharge.

VITAL SIGNS:  Temperature 98 degrees Fahrenheit, respiratory rate 20, pulse rate 84, oxygen saturatio
n 93% on 3 liters of oxygen by nasal cannula and blood pressure 129/69.

GENERAL:  Not in acute distress, sitting comfortably in bed, off oxygen and saturating well while on 
room.

HEENT:  PERRLA.  Sclerae are anicteric, not pale.  Moist mucous membranes.

RESPIRATORY:  Vesicular breath sounds.  No wheezing.

CARDIOVASCULAR:  Regular rate and rhythm.  S1 and S2.  No murmurs, rubs or gallops.

GASTROINTESTINAL:  Soft, nontender and nondistended.  Bowel sounds normoactive.  No hepatosplenomegal
y.

MUSCULOSKELETAL:  No edema.  Pulses present.

NEUROLOGIC:  Alert and well oriented.  No focal deficits.

PSYCHIATRIC:  Normal mood and affect.

SKIN:  Warm, dry and well perfused.  No rashes or lesions.

 

LABORATORY DATA:  WBC 11.3 (steroid induced), hemoglobin 8.8 and platelet count 199.  Sodium 136, pot
assium 3.5, chloride 99, carbon dioxide 33, anion gap 8, BUN 13, creatinine 0.61, glucose 87 and calc
ium 8.5.

 

PROCEDURES PERFORMED:  None.

 

IMAGING DATA:  Spleen ultrasound shows spleen size upper normal.  CTA and chest x-ray as stated in HP
I.

 

CONSULTS:  Pulmonology.

 

DIET:  Heart healthy.

 

CONDITION AT DISCHARGE:  Stable and improved.

 

ACTIVITY:  To resume as tolerated.

 

CARE GOES:  To follow up with his primary care physician within 1 week for repeat labs.

 

DISCHARGE TIME:  65 minutes including chart review and documentation.

## 2018-04-18 NOTE — EKG
Test Reason : AFIB RVR DX

Blood Pressure : ***/*** mmHG

Vent. Rate : 109 BPM     Atrial Rate : 109 BPM

   P-R Int : 000 ms          QRS Dur : 086 ms

    QT Int : 366 ms       P-R-T Axes : 000 039 218 degrees

   QTc Int : 492 ms

 

Atrial fibrillation with rapid ventricular response



Abnormal ECG

 

Confirmed by MILLY BURRELL ([id number]),  JOHANNA MCCORMICK ([id number]) on 4/18/2018 3:05:04 PM

 

Referred By:  ROMELIA           Confirmed By:MILLY BURRELL

## 2019-05-07 ENCOUNTER — HOSPITAL ENCOUNTER (OUTPATIENT)
Dept: HOSPITAL 92 - RAD | Age: 64
Discharge: HOME | End: 2019-05-07
Attending: INTERNAL MEDICINE
Payer: COMMERCIAL

## 2019-05-07 DIAGNOSIS — R06.00: Primary | ICD-10-CM

## 2019-05-07 PROCEDURE — 71046 X-RAY EXAM CHEST 2 VIEWS: CPT

## 2019-05-07 NOTE — RAD
XR Chest Pa   Lat @ POB



HISTORY: Dyspnea



COMPARISON: 4/1/2018 exam



FINDINGS: Heart size within normal limits. There are postop sternotomy changes. Lungs are clear of an
y infiltrative process. No significant bony findings.



IMPRESSION: Stable exam.



Reported By: Meir Hendrickson 

Electronically Signed:  5/7/2019 3:18 PM

## 2019-06-14 ENCOUNTER — HOSPITAL ENCOUNTER (OUTPATIENT)
Dept: HOSPITAL 92 - BICCT | Age: 64
Discharge: HOME | End: 2019-06-14
Attending: INTERNAL MEDICINE
Payer: COMMERCIAL

## 2019-06-14 DIAGNOSIS — J44.9: Primary | ICD-10-CM

## 2019-06-14 DIAGNOSIS — J96.11: ICD-10-CM

## 2019-06-14 DIAGNOSIS — R91.8: ICD-10-CM

## 2019-06-14 DIAGNOSIS — R91.1: ICD-10-CM

## 2019-06-14 DIAGNOSIS — J98.4: ICD-10-CM

## 2019-06-14 DIAGNOSIS — I25.10: ICD-10-CM

## 2019-06-14 PROCEDURE — 71250 CT THORAX DX C-: CPT

## 2019-06-14 NOTE — CT
CT CHEST WITHOUT IV CONTRAST:

 

HISTORY:

Follow up nodule.  COPD.

 

COMPARISON:

CT angio chest from 04/01/2018.

 

FINDINGS:

There are several bilateral nodules, up to 0.5 cm in the right upper lobe and up to 0.3 cm in the lef
t lung.  There is some minimal scarring in both apices, slightly more prominent on the right side.  T
hree-vessel coronary artery calcific disease.  No mediastinal mass or adenopathy.  No acute pulmonary
 parenchymal process.  No pleural effusion or pericardial effusion.

 

IMPRESSION:

Multiple stable bilateral pulmonary nodules, as above.  Consider one year follow-up exam for further 
evaluation.  Other findings as above.

 

POS: OFF

## 2019-09-17 ENCOUNTER — HOSPITAL ENCOUNTER (OUTPATIENT)
Dept: HOSPITAL 92 - CP | Age: 64
Discharge: HOME | End: 2019-09-17
Attending: INTERNAL MEDICINE
Payer: COMMERCIAL

## 2019-09-17 DIAGNOSIS — J96.11: ICD-10-CM

## 2019-09-17 DIAGNOSIS — J44.9: Primary | ICD-10-CM

## 2019-09-17 LAB
ANALYZER IN CARDIO: (no result)
BASE EXCESS STD BLDA CALC-SCNC: -0.1 MEQ/L
CA-I BLDA-SCNC: 1.15 MMOL/L (ref 1.12–1.3)
HCO3 BLDA-SCNC: 21.7 MEQ/L (ref 22–28)
HCT VFR BLDA CALC: 42 % (ref 42–52)
HGB BLDA-MCNC: 14.2 G/DL (ref 14–18)
O2 A-A PPRESDIFF RESPIRATORY: 15.96 MM[HG] (ref 0–20)
PCO2 BLDA: 27.9 MMHG (ref 35–45)
PH BLDA: 7.51 [PH] (ref 7.35–7.45)
PO2 BLDA: 98.9 MMHG (ref 80–?)
POTASSIUM BLD-SCNC: 3.84 MMOL/L (ref 3.7–5.3)
SPECIMEN DRAWN FROM PATIENT: (no result)

## 2019-09-17 PROCEDURE — 82805 BLOOD GASES W/O2 SATURATION: CPT

## 2020-11-02 ENCOUNTER — HOSPITAL ENCOUNTER (OUTPATIENT)
Dept: HOSPITAL 92 - LABBT | Age: 65
Discharge: HOME | End: 2020-11-02
Attending: INTERNAL MEDICINE
Payer: COMMERCIAL

## 2020-11-02 DIAGNOSIS — I48.92: ICD-10-CM

## 2020-11-02 DIAGNOSIS — Z20.828: ICD-10-CM

## 2020-11-02 DIAGNOSIS — Z01.818: Primary | ICD-10-CM

## 2020-11-02 LAB
ALBUMIN SERPL BCG-MCNC: 4.2 G/DL (ref 3.4–4.8)
ALP SERPL-CCNC: 98 U/L (ref 40–110)
ALT SERPL W P-5'-P-CCNC: 47 U/L (ref 8–55)
ANION GAP SERPL CALC-SCNC: 16 MMOL/L (ref 10–20)
APTT PPP: 25.3 SEC (ref 22–33)
AST SERPL-CCNC: 41 U/L (ref 5–34)
BASOPHILS # BLD AUTO: 0.1 10X3/UL (ref 0–0.2)
BASOPHILS NFR BLD AUTO: 0.9 % (ref 0–2)
BILIRUB DIRECT SERPL-MCNC: 0.2 MG/DL (ref 0.1–0.3)
BILIRUB SERPL-MCNC: 0.4 MG/DL (ref 0.2–1.2)
BUN SERPL-MCNC: 15 MG/DL (ref 8.4–25.7)
CALCIUM SERPL-MCNC: 9.6 MG/DL (ref 7.8–10.44)
CHD RISK SERPL-RTO: 2.2 (ref ?–4.5)
CHLORIDE SERPL-SCNC: 103 MMOL/L (ref 98–107)
CHOLEST SERPL-MCNC: 167 MG/DL
CO2 SERPL-SCNC: 23 MMOL/L (ref 23–31)
CREAT CL PREDICTED SERPL C-G-VRATE: 0 ML/MIN (ref 70–130)
EOSINOPHIL # BLD AUTO: 0.1 10X3/UL (ref 0–0.5)
EOSINOPHIL NFR BLD AUTO: 1.4 % (ref 0–6)
GLUCOSE SERPL-MCNC: 115 MG/DL (ref 80–115)
HDLC SERPL-MCNC: 77 MG/DL
HGB BLD-MCNC: 15.1 G/DL (ref 14–18)
INR PPP: 1
LDLC SERPL CALC-MCNC: 71 MG/DL
LYMPHOCYTES NFR BLD AUTO: 16.6 % (ref 18–47)
MCH RBC QN AUTO: 32 PG (ref 27–33)
MCV RBC AUTO: 98.1 FL (ref 80–100)
MONOCYTES # BLD AUTO: 1 10X3/UL (ref 0–1.1)
MONOCYTES NFR BLD AUTO: 10.2 % (ref 0–10)
NEUTROPHILS # BLD AUTO: 7.1 10X3/UL (ref 1.5–8.4)
NEUTROPHILS NFR BLD AUTO: 70.4 % (ref 40–75)
PLATELET # BLD AUTO: 105 10X3/UL (ref 130–400)
POTASSIUM SERPL-SCNC: 4.6 MMOL/L (ref 3.5–5.1)
PROTHROMBIN TIME: 10.3 SEC (ref 9.5–12.1)
RBC # BLD AUTO: 4.72 10X6/UL (ref 4.4–5.8)
SODIUM SERPL-SCNC: 137 MMOL/L (ref 136–145)
TRIGL SERPL-MCNC: 97 MG/DL (ref ?–150)
WBC # BLD AUTO: 10 10X3/UL (ref 4.5–11)

## 2020-11-02 PROCEDURE — 84443 ASSAY THYROID STIM HORMONE: CPT

## 2020-11-02 PROCEDURE — 80048 BASIC METABOLIC PNL TOTAL CA: CPT

## 2020-11-02 PROCEDURE — 85730 THROMBOPLASTIN TIME PARTIAL: CPT

## 2020-11-02 PROCEDURE — 80061 LIPID PANEL: CPT

## 2020-11-02 PROCEDURE — U0003 INFECTIOUS AGENT DETECTION BY NUCLEIC ACID (DNA OR RNA); SEVERE ACUTE RESPIRATORY SYNDROME CORONAVIRUS 2 (SARS-COV-2) (CORONAVIRUS DISEASE [COVID-19]), AMPLIFIED PROBE TECHNIQUE, MAKING USE OF HIGH THROUGHPUT TECHNOLOGIES AS DESCRIBED BY CMS-2020-01-R: HCPCS

## 2020-11-02 PROCEDURE — 80076 HEPATIC FUNCTION PANEL: CPT

## 2020-11-02 PROCEDURE — 93005 ELECTROCARDIOGRAM TRACING: CPT

## 2020-11-02 PROCEDURE — 87635 SARS-COV-2 COVID-19 AMP PRB: CPT

## 2020-11-02 PROCEDURE — 85025 COMPLETE CBC W/AUTO DIFF WBC: CPT

## 2020-11-02 PROCEDURE — 85610 PROTHROMBIN TIME: CPT

## 2020-11-02 PROCEDURE — 93010 ELECTROCARDIOGRAM REPORT: CPT

## 2020-11-02 NOTE — EKG
Test Reason : PREOP

Blood Pressure : ***/*** mmHG

Vent. Rate : 093 BPM     Atrial Rate : 312 BPM

   P-R Int : 000 ms          QRS Dur : 100 ms

    QT Int : 374 ms       P-R-T Axes : 000 -78 091 degrees

   QTc Int : 465 ms

 

Atrial flutter with variable A-V block

Left anterior fascicular block

Septal infarct , age undetermined

Abnormal ECG

No previous ECGs available

Confirmed by MARIKA CHANDLER ([id number]) on 11/2/2020 9:09:15 PM

 

Referred By:  REYNOLD           Confirmed By:MARIKA CHANDLER

## 2020-11-04 ENCOUNTER — HOSPITAL ENCOUNTER (OUTPATIENT)
Dept: HOSPITAL 92 - SDC | Age: 65
Discharge: HOME | End: 2020-11-04
Attending: INTERNAL MEDICINE
Payer: COMMERCIAL

## 2020-11-04 VITALS — BODY MASS INDEX: 20.9 KG/M2

## 2020-11-04 DIAGNOSIS — F17.210: ICD-10-CM

## 2020-11-04 DIAGNOSIS — I48.0: Primary | ICD-10-CM

## 2020-11-04 DIAGNOSIS — Z95.1: ICD-10-CM

## 2020-11-04 DIAGNOSIS — Z79.899: ICD-10-CM

## 2020-11-04 DIAGNOSIS — I48.4: ICD-10-CM

## 2020-11-04 DIAGNOSIS — I10: ICD-10-CM

## 2020-11-04 DIAGNOSIS — I25.10: ICD-10-CM

## 2020-11-04 DIAGNOSIS — Z88.8: ICD-10-CM

## 2020-11-04 DIAGNOSIS — Z88.0: ICD-10-CM

## 2020-11-04 DIAGNOSIS — E78.00: ICD-10-CM

## 2020-11-04 DIAGNOSIS — I07.1: ICD-10-CM

## 2020-11-04 DIAGNOSIS — Z88.5: ICD-10-CM

## 2020-11-04 DIAGNOSIS — Z79.82: ICD-10-CM

## 2020-11-04 PROCEDURE — 92960 CARDIOVERSION ELECTRIC EXT: CPT

## 2020-11-04 PROCEDURE — 93005 ELECTROCARDIOGRAM TRACING: CPT

## 2020-11-04 PROCEDURE — 93312 ECHO TRANSESOPHAGEAL: CPT

## 2020-11-04 PROCEDURE — B245ZZ4 ULTRASONOGRAPHY OF LEFT HEART, TRANSESOPHAGEAL: ICD-10-PCS | Performed by: INTERNAL MEDICINE

## 2020-11-04 PROCEDURE — 93010 ELECTROCARDIOGRAM REPORT: CPT

## 2020-11-04 PROCEDURE — 5A2204Z RESTORATION OF CARDIAC RHYTHM, SINGLE: ICD-10-PCS | Performed by: INTERNAL MEDICINE

## 2020-11-04 NOTE — EKG
Test Reason : POST MIGUEL/CARDIOVERSI

Blood Pressure : ***/*** mmHG

Vent. Rate : 075 BPM     Atrial Rate : 075 BPM

   P-R Int : 222 ms          QRS Dur : 096 ms

    QT Int : 414 ms       P-R-T Axes : 085 -73 067 degrees

   QTc Int : 462 ms

 

Sinus rhythm with 1st degree A-V block

Left axis deviation

Pulmonary disease pattern

Abnormal ECG

When compared with ECG of 02-NOV-2020 12:34,

Sinus rhythm has replaced Atrial flutter

Confirmed by MARIKA CHANDLER ([id number]) on 11/4/2020 9:11:25 PM

 

Referred By:  REYNOLD           Confirmed By:MARIKA CHANDLER

## 2020-11-05 NOTE — OP
DATE OF PROCEDURE:  11/04/2020



PROCEDURE PERFORMED:  Electrocardioversion.



INDICATION:  This is a 65-year-old gentleman with paroxysmal atrial fibrillation.



DESCRIPTION OF PROCEDURE:  The patient was taken to the PACU.  The patient was

sedated by Anesthesiology.  The patient was shocked with 200 joules of synchronized

electricity.  The patient was converted to normal sinus rhythm. 



IMPRESSION:  Successful electrocardioversion.







Job ID:  [id number]

## 2020-11-05 NOTE — OP
DATE OF PROCEDURE:  11/04/2020



PRIMARY CARE PHYSICIAN:  Dr. Deborah Glynn.



PROCEDURE PERFORMED:  Transesophageal echocardiogram.



INDICATIONS:  This is a 65-year-old gentleman with paroxysmal atrial fibrillation.



DESCRIPTION OF PROCEDURE:  The patient was taken to the PACU.  The patient was

sedated by Anesthesiology.  A transesophageal probe was placed into the distal

esophagus and stomach.  Echocardiographic images were obtained.  The transesophageal

probe was removed. 



FINDINGS:  

1. Normal left ventricular systolic function.

2. Normal mitral and aortic valves.

3. Mild tricuspid regurgitation.

4. Status post closure of the left atrial appendage.

5. Atherosclerotic debris in the descending aorta.



IMPRESSION:  Left atrial appendage is well ligated.







Job ID:  [id number]